# Patient Record
Sex: FEMALE | Race: BLACK OR AFRICAN AMERICAN | NOT HISPANIC OR LATINO | URBAN - METROPOLITAN AREA
[De-identification: names, ages, dates, MRNs, and addresses within clinical notes are randomized per-mention and may not be internally consistent; named-entity substitution may affect disease eponyms.]

---

## 2023-02-20 PROBLEM — M79.2 NEUROPATHIC PAIN: Status: ACTIVE | Noted: 2023-02-20

## 2023-02-20 PROBLEM — Z90.710 S/P HYSTERECTOMY: Status: ACTIVE | Noted: 2023-02-20

## 2023-02-20 PROBLEM — M77.30 HEEL SPUR: Status: ACTIVE | Noted: 2023-02-20

## 2023-02-20 PROBLEM — N89.8 VAGINAL DISCHARGE: Status: ACTIVE | Noted: 2023-02-20

## 2023-02-20 PROBLEM — R30.0 DYSURIA: Status: ACTIVE | Noted: 2023-02-20

## 2023-02-20 PROBLEM — M79.672 PAIN, FOOT, LEFT, CHRONIC: Status: ACTIVE | Noted: 2023-02-20

## 2023-02-20 PROBLEM — R10.9 ABDOMINAL PAIN: Status: ACTIVE | Noted: 2023-02-20

## 2023-02-20 PROBLEM — R12 HEART BURN: Status: ACTIVE | Noted: 2023-02-20

## 2023-02-20 PROBLEM — G47.33 OBSTRUCTIVE SLEEP APNEA SYNDROME, SEVERE: Status: ACTIVE | Noted: 2023-02-20

## 2023-02-20 PROBLEM — Z98.84 BARIATRIC SURGERY STATUS: Status: ACTIVE | Noted: 2023-02-20

## 2023-02-20 PROBLEM — B37.2 CANDIDIASIS, INTERTRIGO: Status: ACTIVE | Noted: 2023-02-20

## 2023-02-20 PROBLEM — D50.9 IRON DEFICIENCY ANEMIA: Status: ACTIVE | Noted: 2023-02-20

## 2023-02-20 PROBLEM — M79.673 HEEL PAIN: Status: ACTIVE | Noted: 2023-02-20

## 2023-02-20 PROBLEM — R32 URINARY LEAKAGE: Status: ACTIVE | Noted: 2023-02-20

## 2023-02-20 PROBLEM — R53.83 FATIGUE: Status: ACTIVE | Noted: 2023-02-20

## 2023-02-20 PROBLEM — D64.9 ANEMIA: Status: ACTIVE | Noted: 2023-02-20

## 2023-02-20 PROBLEM — E11.9 DIABETES MELLITUS (MULTI): Status: ACTIVE | Noted: 2023-02-20

## 2023-02-20 PROBLEM — E78.5 HYPERLIPIDEMIA: Status: ACTIVE | Noted: 2023-02-20

## 2023-02-20 PROBLEM — N92.6 IRREGULAR MENSTRUAL CYCLE: Status: ACTIVE | Noted: 2023-02-20

## 2023-02-20 PROBLEM — G47.30 SEVERE SLEEP APNEA: Status: ACTIVE | Noted: 2023-02-20

## 2023-02-20 PROBLEM — R19.5 DARK STOOLS: Status: ACTIVE | Noted: 2023-02-20

## 2023-02-20 PROBLEM — G89.29 PAIN, FOOT, LEFT, CHRONIC: Status: ACTIVE | Noted: 2023-02-20

## 2023-02-20 PROBLEM — E55.9 VITAMIN D DEFICIENCY: Status: ACTIVE | Noted: 2023-02-20

## 2023-02-20 PROBLEM — R63.4 RAPID WEIGHT LOSS: Status: ACTIVE | Noted: 2023-02-20

## 2023-02-20 PROBLEM — G47.00 INSOMNIA: Status: ACTIVE | Noted: 2023-02-20

## 2023-02-20 PROBLEM — G89.18 POSTOPERATIVE PAIN: Status: ACTIVE | Noted: 2023-02-20

## 2023-02-20 PROBLEM — N94.6 DYSMENORRHEA: Status: ACTIVE | Noted: 2023-02-20

## 2023-02-20 PROBLEM — N93.9 ABNORMAL UTERINE BLEEDING: Status: ACTIVE | Noted: 2023-02-20

## 2023-02-20 PROBLEM — G47.33 OBSTRUCTIVE SLEEP APNEA SYNDROME: Status: ACTIVE | Noted: 2023-02-20

## 2023-02-20 PROBLEM — R07.89 ATYPICAL CHEST PAIN: Status: ACTIVE | Noted: 2023-02-20

## 2023-02-20 PROBLEM — L65.9 HAIR LOSS: Status: ACTIVE | Noted: 2023-02-20

## 2023-02-20 PROBLEM — R73.09 ABNORMAL GLUCOSE LEVEL: Status: ACTIVE | Noted: 2023-02-20

## 2023-02-20 PROBLEM — I10 HTN (HYPERTENSION): Status: ACTIVE | Noted: 2023-02-20

## 2023-02-20 PROBLEM — E66.01 MORBID OBESITY (MULTI): Status: ACTIVE | Noted: 2023-02-20

## 2023-02-20 PROBLEM — K42.9 UMBILICAL HERNIA WITHOUT OBSTRUCTION AND WITHOUT GANGRENE: Status: ACTIVE | Noted: 2023-02-20

## 2023-02-20 PROBLEM — K91.2 MALNUTRITION FOLLOWING GASTROINTESTINAL SURGERY (HHS-HCC): Status: ACTIVE | Noted: 2023-02-20

## 2023-02-20 RX ORDER — CYANOCOBALAMIN 1000 UG/ML
1000 INJECTION, SOLUTION INTRAMUSCULAR; SUBCUTANEOUS
COMMUNITY
Start: 2019-12-09 | End: 2023-03-17 | Stop reason: SDUPTHER

## 2023-02-20 RX ORDER — GABAPENTIN 300 MG/1
1 CAPSULE ORAL 2 TIMES DAILY
COMMUNITY
Start: 2020-04-13 | End: 2023-03-17 | Stop reason: SDUPTHER

## 2023-02-20 RX ORDER — MULTIVIT-MINERALS/FOLIC ACID 200 MCG
2 TABLET,CHEWABLE ORAL 2 TIMES DAILY
COMMUNITY
Start: 2020-03-02 | End: 2023-03-17 | Stop reason: SDUPTHER

## 2023-02-20 RX ORDER — OMEPRAZOLE 40 MG/1
1 CAPSULE, DELAYED RELEASE ORAL DAILY
COMMUNITY
Start: 2017-06-21 | End: 2023-03-17 | Stop reason: SDUPTHER

## 2023-02-20 RX ORDER — ASPIRIN 325 MG
50000 TABLET, DELAYED RELEASE (ENTERIC COATED) ORAL
COMMUNITY
Start: 2019-05-15 | End: 2024-02-20 | Stop reason: SDUPTHER

## 2023-02-20 RX ORDER — ACETAMINOPHEN 500 MG
500 TABLET ORAL AS NEEDED
COMMUNITY
Start: 2022-01-10 | End: 2024-02-16 | Stop reason: SDUPTHER

## 2023-02-20 RX ORDER — FERROUS SULFATE 325(65) MG
1 TABLET ORAL
COMMUNITY
Start: 2019-05-02 | End: 2023-03-17 | Stop reason: SDUPTHER

## 2023-03-17 ENCOUNTER — OFFICE VISIT (OUTPATIENT)
Dept: PRIMARY CARE | Facility: CLINIC | Age: 43
End: 2023-03-17
Payer: COMMERCIAL

## 2023-03-17 VITALS
HEART RATE: 82 BPM | WEIGHT: 242.3 LBS | DIASTOLIC BLOOD PRESSURE: 83 MMHG | BODY MASS INDEX: 38.94 KG/M2 | OXYGEN SATURATION: 96 % | TEMPERATURE: 97.3 F | HEIGHT: 66 IN | SYSTOLIC BLOOD PRESSURE: 133 MMHG

## 2023-03-17 DIAGNOSIS — G89.29 PAIN, FOOT, LEFT, CHRONIC: ICD-10-CM

## 2023-03-17 DIAGNOSIS — E11.9 TYPE 2 DIABETES MELLITUS WITHOUT COMPLICATION, WITHOUT LONG-TERM CURRENT USE OF INSULIN (MULTI): ICD-10-CM

## 2023-03-17 DIAGNOSIS — N62 MACROMASTIA: ICD-10-CM

## 2023-03-17 DIAGNOSIS — Z98.84 BARIATRIC SURGERY STATUS: ICD-10-CM

## 2023-03-17 DIAGNOSIS — M79.672 PAIN, FOOT, LEFT, CHRONIC: ICD-10-CM

## 2023-03-17 DIAGNOSIS — R12 HEART BURN: Primary | ICD-10-CM

## 2023-03-17 PROCEDURE — 3075F SYST BP GE 130 - 139MM HG: CPT | Performed by: STUDENT IN AN ORGANIZED HEALTH CARE EDUCATION/TRAINING PROGRAM

## 2023-03-17 PROCEDURE — 1036F TOBACCO NON-USER: CPT | Performed by: STUDENT IN AN ORGANIZED HEALTH CARE EDUCATION/TRAINING PROGRAM

## 2023-03-17 PROCEDURE — 99214 OFFICE O/P EST MOD 30 MIN: CPT | Performed by: FAMILY MEDICINE

## 2023-03-17 PROCEDURE — 3079F DIAST BP 80-89 MM HG: CPT | Performed by: STUDENT IN AN ORGANIZED HEALTH CARE EDUCATION/TRAINING PROGRAM

## 2023-03-17 RX ORDER — OMEPRAZOLE 40 MG/1
40 CAPSULE, DELAYED RELEASE ORAL DAILY
Qty: 60 CAPSULE | Refills: 1 | Status: SHIPPED | OUTPATIENT
Start: 2023-03-17 | End: 2023-09-04 | Stop reason: SDUPTHER

## 2023-03-17 RX ORDER — CYANOCOBALAMIN 1000 UG/ML
1000 INJECTION, SOLUTION INTRAMUSCULAR; SUBCUTANEOUS
Qty: 1 ML | Refills: 3 | Status: SHIPPED | OUTPATIENT
Start: 2023-03-17 | End: 2023-09-14 | Stop reason: SDUPTHER

## 2023-03-17 RX ORDER — GABAPENTIN 300 MG/1
300 CAPSULE ORAL 2 TIMES DAILY
Qty: 60 CAPSULE | Refills: 2 | Status: SHIPPED | OUTPATIENT
Start: 2023-03-17 | End: 2024-02-16 | Stop reason: SDUPTHER

## 2023-03-17 RX ORDER — MULTIVIT-MINERALS/FOLIC ACID 200 MCG
1 TABLET,CHEWABLE ORAL 2 TIMES DAILY
Qty: 60 EACH | Refills: 3 | Status: SHIPPED | OUTPATIENT
Start: 2023-03-17 | End: 2023-12-14 | Stop reason: SDUPTHER

## 2023-03-17 RX ORDER — FERROUS SULFATE 325(65) MG
1 TABLET ORAL
Qty: 90 TABLET | Refills: 3 | Status: SHIPPED | OUTPATIENT
Start: 2023-03-17 | End: 2023-09-14 | Stop reason: SDUPTHER

## 2023-03-17 ASSESSMENT — PAIN SCALES - GENERAL: PAINLEVEL: 8

## 2023-03-20 NOTE — PROGRESS NOTES
Subjective   Patient ID: Sheri Santos is a 42 y.o. female who presents for Med Refill and Pain (Shoulder and back).    HPI     42 years old female with history of bariatric surgery, type 2 diabetes chronic pain who presented to the clinic today with a complaint of upper back pain and requested medication refill    Upper back pain due to big breasts  - Patient is reporting that she is suffering from chronic upper back pain that she attributes to large breast and she tried multiple conservative measures for many years and now she is ready to be referred to surgery to reduce the size of her breasts.    Bariatric surgery status    Patient bariatric surgery about 5 years ago now she regained most of the weight that she lost of the surgery and would like to receive refill of her chronic medication that she uses ever since the bariatric surgery  -     omeprazole (PriLOSEC) 40 mg DR capsule; Take 1 capsule (40 mg) by mouth once daily.  -     ferrous sulfate 325 (65 Fe) MG tablet; Take 1 tablet (325 mg) by mouth in the morning and 1 tablet (325 mg) in the evening. Take with meals.  -     multivit with min-folic acid (Daily Gummies) 200 mcg tablet,chewable; Chew 1 tablet  in the morning and 1 tablet before bedtime.  -     cyanocobalamin (Vitamin B-12) 1,000 mcg/mL injection; Inject 1 mL (1,000 mcg) into the shoulder, thigh, or buttocks every 30 (thirty) days.  Chronic leg pain  - Patient requesting refill of  -     gabapentin (Neurontin) 300 mg capsule; Take 1 capsule (300 mg) by mouth in the morning and 1 capsule (300 mg) before bedtime.    Type 2 diabetes  - Last A1c was 6.7 in January 2022  - Patient is not currently on any medication and would prefer to try to control her diabetes with diet and exercise    Review of Systems  Review of system is negative unless stated in HPI  Objective   Blood Pressure 133/83 (BP Location: Right arm, Patient Position: Sitting, BP Cuff Size: Large adult)   Pulse 82   Temperature 36.3  "°C (97.3 °F) (Temporal)   Height 1.676 m (5' 6\")   Weight 110 kg (242 lb 4.8 oz)   Oxygen Saturation 96%   Body Mass Index 39.11 kg/m²     Physical Exam  Constitutional: Morbidly obese, does not appear to be in any acute distress   Cardiovascular: RRR, S1/S2, no murmurs, rubs, or gallops, radial pulses +2, no edema of extremities  Pulmonary: CTAB, no respiratory distress.  Abdomen: +BS, soft, non-tender, nondistended, no guarding or rebound, no masses noted  Breast exam: Macromastia  Skin- No lesions, contusions, or erythema.  Psychiatric: Judgment intact. Appropriate mood and behavior    Assessment/Plan   Diagnoses and all orders for this visit:    Pain, foot, left, chronic  -     gabapentin (Neurontin) 300 mg capsule; Take 1 capsule (300 mg) by mouth in the morning and 1 capsule (300 mg) before bedtime.  Bariatric surgery status  -     omeprazole (PriLOSEC) 40 mg DR capsule; Take 1 capsule (40 mg) by mouth once daily.  -     ferrous sulfate 325 (65 Fe) MG tablet; Take 1 tablet (325 mg) by mouth in the morning and 1 tablet (325 mg) in the evening. Take with meals.  -     multivit with min-folic acid (Daily Gummies) 200 mcg tablet,chewable; Chew 1 tablet  in the morning and 1 tablet before bedtime.  -     cyanocobalamin (Vitamin B-12) 1,000 mcg/mL injection; Inject 1 mL (1,000 mcg) into the shoulder, thigh, or buttocks every 30 (thirty) days.  Type 2 diabetes mellitus without complication, without long-term current use of insulin (CMS/[facility])  -     Comprehensive Metabolic Panel; Future  -     Hemoglobin A1C; Future  -     Lipid Panel; Future  -     Albumin , Urine Random; Future  Macromastia  -     Referral to Plastic Surgery; Future    Discussed with attending Dr. Dinh Armstrong [facility] medicine department PGY 3     "

## 2023-03-23 NOTE — PROGRESS NOTES
I saw and evaluated the patient. I personally obtained the key and critical portions of the history and physical exam or was physically present for key and critical portions performed by the resident/fellow. I reviewed the resident/fellow's documentation and discussed the patient with the resident/fellow. I agree with the resident/fellow's medical decision making as documented in the note.    Hortensia Tao MD

## 2023-03-31 ENCOUNTER — APPOINTMENT (OUTPATIENT)
Dept: PRIMARY CARE | Facility: CLINIC | Age: 43
End: 2023-03-31
Payer: COMMERCIAL

## 2023-04-11 ENCOUNTER — APPOINTMENT (OUTPATIENT)
Dept: PRIMARY CARE | Facility: CLINIC | Age: 43
End: 2023-04-11
Payer: COMMERCIAL

## 2023-05-10 LAB
ALANINE AMINOTRANSFERASE (SGPT) (U/L) IN SER/PLAS: 28 U/L (ref 7–45)
ALBUMIN (G/DL) IN SER/PLAS: 4 G/DL (ref 3.4–5)
ALBUMIN (MG/L) IN URINE: 8.3 MG/L
ALBUMIN/CREATININE (UG/MG) IN URINE: 3.6 UG/MG CRT (ref 0–30)
ALKALINE PHOSPHATASE (U/L) IN SER/PLAS: 90 U/L (ref 33–110)
ANION GAP IN SER/PLAS: 10 MMOL/L (ref 10–20)
ASPARTATE AMINOTRANSFERASE (SGOT) (U/L) IN SER/PLAS: 20 U/L (ref 9–39)
BILIRUBIN TOTAL (MG/DL) IN SER/PLAS: 0.4 MG/DL (ref 0–1.2)
CALCIUM (MG/DL) IN SER/PLAS: 9.2 MG/DL (ref 8.6–10.6)
CARBON DIOXIDE, TOTAL (MMOL/L) IN SER/PLAS: 29 MMOL/L (ref 21–32)
CHLORIDE (MMOL/L) IN SER/PLAS: 107 MMOL/L (ref 98–107)
CHOLESTEROL (MG/DL) IN SER/PLAS: 193 MG/DL (ref 0–199)
CHOLESTEROL IN HDL (MG/DL) IN SER/PLAS: 56.5 MG/DL
CHOLESTEROL/HDL RATIO: 3.4
CREATININE (MG/DL) IN SER/PLAS: 0.97 MG/DL (ref 0.5–1.05)
CREATININE (MG/DL) IN URINE: 230 MG/DL (ref 20–320)
ESTIMATED AVERAGE GLUCOSE FOR HBA1C: 140 MG/DL
GFR FEMALE: 74 ML/MIN/1.73M2
GLUCOSE (MG/DL) IN SER/PLAS: 124 MG/DL (ref 74–99)
HEMOGLOBIN A1C/HEMOGLOBIN TOTAL IN BLOOD: 6.5 %
LDL: 107 MG/DL (ref 0–99)
POTASSIUM (MMOL/L) IN SER/PLAS: 4.2 MMOL/L (ref 3.5–5.3)
PROTEIN TOTAL: 7 G/DL (ref 6.4–8.2)
SODIUM (MMOL/L) IN SER/PLAS: 142 MMOL/L (ref 136–145)
TRIGLYCERIDE (MG/DL) IN SER/PLAS: 150 MG/DL (ref 0–149)
UREA NITROGEN (MG/DL) IN SER/PLAS: 15 MG/DL (ref 6–23)
VLDL: 30 MG/DL (ref 0–40)

## 2023-09-04 DIAGNOSIS — Z98.84 BARIATRIC SURGERY STATUS: ICD-10-CM

## 2023-09-04 RX ORDER — OMEPRAZOLE 40 MG/1
40 CAPSULE, DELAYED RELEASE ORAL DAILY
Qty: 60 CAPSULE | Refills: 3 | Status: SHIPPED | OUTPATIENT
Start: 2023-09-04 | End: 2024-02-16 | Stop reason: SDUPTHER

## 2023-09-05 NOTE — PROGRESS NOTES
Medication refill only for Omeprazole 40 mg daily. Formerly seen by Dr. Nathaniel Rosales and will need to establish care with new physician.

## 2023-09-14 DIAGNOSIS — Z98.84 BARIATRIC SURGERY STATUS: ICD-10-CM

## 2023-09-14 RX ORDER — CYANOCOBALAMIN 1000 UG/ML
1000 INJECTION, SOLUTION INTRAMUSCULAR; SUBCUTANEOUS
Qty: 1 ML | Refills: 3 | Status: SHIPPED | OUTPATIENT
Start: 2023-09-14 | End: 2024-02-20 | Stop reason: SDUPTHER

## 2023-09-14 RX ORDER — FERROUS SULFATE 325(65) MG
1 TABLET ORAL
Qty: 90 TABLET | Refills: 3 | Status: SHIPPED | OUTPATIENT
Start: 2023-09-14 | End: 2024-02-16 | Stop reason: SDUPTHER

## 2023-12-14 DIAGNOSIS — Z98.84 BARIATRIC SURGERY STATUS: ICD-10-CM

## 2023-12-14 RX ORDER — MULTIVIT-MINERALS/FOLIC ACID 200 MCG
1 TABLET,CHEWABLE ORAL 2 TIMES DAILY
Qty: 60 EACH | Refills: 3 | Status: SHIPPED | OUTPATIENT
Start: 2023-12-14 | End: 2024-02-16 | Stop reason: SDUPTHER

## 2024-02-16 ENCOUNTER — OFFICE VISIT (OUTPATIENT)
Dept: OBSTETRICS AND GYNECOLOGY | Facility: CLINIC | Age: 44
End: 2024-02-16
Payer: COMMERCIAL

## 2024-02-16 ENCOUNTER — LAB (OUTPATIENT)
Dept: LAB | Facility: LAB | Age: 44
End: 2024-02-16
Payer: COMMERCIAL

## 2024-02-16 VITALS — BODY MASS INDEX: 40.4 KG/M2 | HEIGHT: 66 IN | WEIGHT: 251.4 LBS

## 2024-02-16 DIAGNOSIS — G89.29 CHRONIC PAIN OF BOTH SHOULDERS: ICD-10-CM

## 2024-02-16 DIAGNOSIS — G89.29 PAIN, FOOT, LEFT, CHRONIC: ICD-10-CM

## 2024-02-16 DIAGNOSIS — M25.511 CHRONIC PAIN OF BOTH SHOULDERS: ICD-10-CM

## 2024-02-16 DIAGNOSIS — M54.2 NECK PAIN: ICD-10-CM

## 2024-02-16 DIAGNOSIS — N62 MACROMASTIA: ICD-10-CM

## 2024-02-16 DIAGNOSIS — E11.9 TYPE 2 DIABETES MELLITUS WITHOUT COMPLICATION, WITHOUT LONG-TERM CURRENT USE OF INSULIN (MULTI): ICD-10-CM

## 2024-02-16 DIAGNOSIS — Z12.11 COLON CANCER SCREENING: ICD-10-CM

## 2024-02-16 DIAGNOSIS — R63.5 WEIGHT GAIN: ICD-10-CM

## 2024-02-16 DIAGNOSIS — Z01.419 WOMEN'S ANNUAL ROUTINE GYNECOLOGICAL EXAMINATION: Primary | ICD-10-CM

## 2024-02-16 DIAGNOSIS — Z00.00 HEALTHCARE MAINTENANCE: ICD-10-CM

## 2024-02-16 DIAGNOSIS — E55.9 VITAMIN D INSUFFICIENCY: ICD-10-CM

## 2024-02-16 DIAGNOSIS — M25.512 CHRONIC PAIN OF BOTH SHOULDERS: ICD-10-CM

## 2024-02-16 DIAGNOSIS — M79.672 PAIN, FOOT, LEFT, CHRONIC: ICD-10-CM

## 2024-02-16 DIAGNOSIS — Z98.84 BARIATRIC SURGERY STATUS: ICD-10-CM

## 2024-02-16 DIAGNOSIS — Z12.31 ENCOUNTER FOR SCREENING MAMMOGRAM FOR MALIGNANT NEOPLASM OF BREAST: ICD-10-CM

## 2024-02-16 DIAGNOSIS — N39.3 STRESS INCONTINENCE OF URINE: ICD-10-CM

## 2024-02-16 LAB — 25(OH)D3 SERPL-MCNC: 35 NG/ML (ref 30–100)

## 2024-02-16 PROCEDURE — 3048F LDL-C <100 MG/DL: CPT | Performed by: OBSTETRICS & GYNECOLOGY

## 2024-02-16 PROCEDURE — 36415 COLL VENOUS BLD VENIPUNCTURE: CPT

## 2024-02-16 PROCEDURE — 1036F TOBACCO NON-USER: CPT | Performed by: OBSTETRICS & GYNECOLOGY

## 2024-02-16 PROCEDURE — 99396 PREV VISIT EST AGE 40-64: CPT | Performed by: OBSTETRICS & GYNECOLOGY

## 2024-02-16 PROCEDURE — 3044F HG A1C LEVEL LT 7.0%: CPT | Performed by: OBSTETRICS & GYNECOLOGY

## 2024-02-16 PROCEDURE — 99214 OFFICE O/P EST MOD 30 MIN: CPT | Performed by: OBSTETRICS & GYNECOLOGY

## 2024-02-16 PROCEDURE — 80061 LIPID PANEL: CPT

## 2024-02-16 PROCEDURE — 80053 COMPREHEN METABOLIC PANEL: CPT

## 2024-02-16 PROCEDURE — 83036 HEMOGLOBIN GLYCOSYLATED A1C: CPT

## 2024-02-16 PROCEDURE — 82306 VITAMIN D 25 HYDROXY: CPT

## 2024-02-16 RX ORDER — CYANOCOBALAMIN 1000 UG/ML
1000 INJECTION, SOLUTION INTRAMUSCULAR; SUBCUTANEOUS
Qty: 1 ML | Refills: 3 | Status: CANCELLED | OUTPATIENT
Start: 2024-02-16

## 2024-02-16 RX ORDER — MULTIVIT-MINERALS/FOLIC ACID 200 MCG
1 TABLET,CHEWABLE ORAL 2 TIMES DAILY
Qty: 60 EACH | Refills: 3 | Status: SHIPPED | OUTPATIENT
Start: 2024-02-16 | End: 2024-03-22 | Stop reason: SDUPTHER

## 2024-02-16 RX ORDER — ACETAMINOPHEN 500 MG
500 TABLET ORAL AS NEEDED
Qty: 30 TABLET | Refills: 3 | Status: SHIPPED | OUTPATIENT
Start: 2024-02-16 | End: 2024-03-22 | Stop reason: SDUPTHER

## 2024-02-16 RX ORDER — ASPIRIN 325 MG
50000 TABLET, DELAYED RELEASE (ENTERIC COATED) ORAL
Status: CANCELLED | OUTPATIENT
Start: 2024-02-16

## 2024-02-16 RX ORDER — FERROUS SULFATE 325(65) MG
1 TABLET ORAL
Qty: 90 TABLET | Refills: 3 | Status: SHIPPED | OUTPATIENT
Start: 2024-02-16 | End: 2024-03-22 | Stop reason: SDUPTHER

## 2024-02-16 RX ORDER — GABAPENTIN 300 MG/1
300 CAPSULE ORAL 2 TIMES DAILY
Qty: 60 CAPSULE | Refills: 2 | Status: SHIPPED | OUTPATIENT
Start: 2024-02-16 | End: 2024-03-22 | Stop reason: SDUPTHER

## 2024-02-16 RX ORDER — OMEPRAZOLE 40 MG/1
40 CAPSULE, DELAYED RELEASE ORAL DAILY
Qty: 60 CAPSULE | Refills: 3 | Status: SHIPPED | OUTPATIENT
Start: 2024-02-16 | End: 2024-03-22 | Stop reason: SDUPTHER

## 2024-02-16 ASSESSMENT — PAIN SCALES - GENERAL: PAINLEVEL: 0-NO PAIN

## 2024-02-16 NOTE — PROGRESS NOTES
Sheri Santos 43 y.o. y/o who presents for annual gyn exam.      Preventive health  - cervical cancer screening no longer indicated  - mammogram due ordered  - c-scope due ordered    Concerned about weight gain  - referral to dietitian  - discussed returning to [facility] to discuss possibility of other interventions to help with weight loss goals    Upper back, shoulder pain, possibly due to macromastia  - referral to plastic surgery to discuss breast reduction  - referral to PT for left shoulder pain, neck pain for strengthening    Urinary incontinence  - pelvic floor PT referral    Referral to primary care  - wants somebody who will not leave Q 3 years  - meds refilled today        -------------------------------------  HPI    Leaking urine when laughs/coughs    Concerned about regain of weight, has h/o weight loss surgery    Referral to plastic surgery      Physical Exam  Constitutional:       Appearance: Normal appearance.   Genitourinary:      Vulva normal.      Right Labia: No rash, lesions, skin changes or Bartholin's cyst.     Left Labia: No lesions, skin changes, Bartholin's cyst or rash.     Vaginal cuff intact.     No vaginal discharge, erythema, tenderness, bleeding, ulceration or granulation tissue.      No cervical discharge, lesion or polyp.      Pelvic Floor: Levator muscle strength is 2/5.  HENT:      Head: Normocephalic and atraumatic.   Pulmonary:      Effort: Pulmonary effort is normal.   Musculoskeletal:      Cervical back: Neck supple.   Neurological:      Mental Status: She is alert.   Skin:     General: Skin is warm and dry.   Psychiatric:         Mood and Affect: Mood normal.         Behavior: Behavior normal.         Thought Content: Thought content normal.         Judgment: Judgment normal.

## 2024-02-17 LAB
ALBUMIN SERPL BCP-MCNC: 4 G/DL (ref 3.4–5)
ALP SERPL-CCNC: 82 U/L (ref 33–110)
ALT SERPL W P-5'-P-CCNC: 17 U/L (ref 7–45)
ANION GAP SERPL CALC-SCNC: 14 MMOL/L (ref 10–20)
AST SERPL W P-5'-P-CCNC: 17 U/L (ref 9–39)
BILIRUB SERPL-MCNC: 0.2 MG/DL (ref 0–1.2)
BUN SERPL-MCNC: 11 MG/DL (ref 6–23)
CALCIUM SERPL-MCNC: 9 MG/DL (ref 8.6–10.6)
CHLORIDE SERPL-SCNC: 106 MMOL/L (ref 98–107)
CHOLEST SERPL-MCNC: 160 MG/DL (ref 0–199)
CHOLESTEROL/HDL RATIO: 3.3
CO2 SERPL-SCNC: 27 MMOL/L (ref 21–32)
CREAT SERPL-MCNC: 1 MG/DL (ref 0.5–1.05)
EGFRCR SERPLBLD CKD-EPI 2021: 71 ML/MIN/1.73M*2
EST. AVERAGE GLUCOSE BLD GHB EST-MCNC: 140 MG/DL
GLUCOSE SERPL-MCNC: 87 MG/DL (ref 74–99)
HBA1C MFR BLD: 6.5 %
HDLC SERPL-MCNC: 48.9 MG/DL
LDLC SERPL CALC-MCNC: 87 MG/DL
NON HDL CHOLESTEROL: 111 MG/DL (ref 0–149)
POTASSIUM SERPL-SCNC: 4.4 MMOL/L (ref 3.5–5.3)
PROT SERPL-MCNC: 7 G/DL (ref 6.4–8.2)
SODIUM SERPL-SCNC: 143 MMOL/L (ref 136–145)
TRIGL SERPL-MCNC: 120 MG/DL (ref 0–149)
VLDL: 24 MG/DL (ref 0–40)

## 2024-02-18 PROBLEM — Z01.419 WOMEN'S ANNUAL ROUTINE GYNECOLOGICAL EXAMINATION: Status: ACTIVE | Noted: 2024-02-18

## 2024-02-20 DIAGNOSIS — E55.9 VITAMIN D DEFICIENCY: Primary | ICD-10-CM

## 2024-02-20 DIAGNOSIS — Z98.84 BARIATRIC SURGERY STATUS: ICD-10-CM

## 2024-02-20 RX ORDER — ASPIRIN 325 MG
50000 TABLET, DELAYED RELEASE (ENTERIC COATED) ORAL
Qty: 4 CAPSULE | Refills: 3 | Status: SHIPPED | OUTPATIENT
Start: 2024-02-20 | End: 2024-03-22 | Stop reason: SDUPTHER

## 2024-02-20 RX ORDER — CYANOCOBALAMIN 1000 UG/ML
1000 INJECTION, SOLUTION INTRAMUSCULAR; SUBCUTANEOUS
Qty: 1 ML | Refills: 3 | Status: SHIPPED | OUTPATIENT
Start: 2024-02-20 | End: 2024-03-22 | Stop reason: SDUPTHER

## 2024-02-20 NOTE — TELEPHONE ENCOUNTER
Pt called requesting refill of vitamins B12 and D3. Message sent to provider.    - Not seen since March 17th 2023 via [facility].     - Last B12 was 543 Jan 2022, althoufgh patient has hx of bariatric surgery and need lifelong Vitamin B12 supplementation    - Last Vit D was 35 on 2/16/2024 -  Refilled    - Has appointment March 28th

## 2024-03-22 ENCOUNTER — OFFICE VISIT (OUTPATIENT)
Dept: PRIMARY CARE | Facility: CLINIC | Age: 44
End: 2024-03-22
Payer: COMMERCIAL

## 2024-03-22 VITALS
OXYGEN SATURATION: 96 % | BODY MASS INDEX: 40.02 KG/M2 | HEIGHT: 66 IN | DIASTOLIC BLOOD PRESSURE: 92 MMHG | TEMPERATURE: 97.1 F | HEART RATE: 79 BPM | WEIGHT: 249 LBS | SYSTOLIC BLOOD PRESSURE: 139 MMHG

## 2024-03-22 DIAGNOSIS — M25.511 CHRONIC PAIN OF BOTH SHOULDERS: ICD-10-CM

## 2024-03-22 DIAGNOSIS — M25.512 CHRONIC PAIN OF BOTH SHOULDERS: ICD-10-CM

## 2024-03-22 DIAGNOSIS — G89.29 CHRONIC PAIN OF BOTH SHOULDERS: ICD-10-CM

## 2024-03-22 DIAGNOSIS — Z98.84 BARIATRIC SURGERY STATUS: ICD-10-CM

## 2024-03-22 DIAGNOSIS — G47.00 INSOMNIA, UNSPECIFIED TYPE: Primary | ICD-10-CM

## 2024-03-22 DIAGNOSIS — E55.9 VITAMIN D DEFICIENCY: ICD-10-CM

## 2024-03-22 DIAGNOSIS — M54.2 NECK PAIN: ICD-10-CM

## 2024-03-22 DIAGNOSIS — E11.42 TYPE 2 DIABETES MELLITUS WITH DIABETIC POLYNEUROPATHY, WITHOUT LONG-TERM CURRENT USE OF INSULIN (MULTI): ICD-10-CM

## 2024-03-22 DIAGNOSIS — E11.9 TYPE 2 DIABETES MELLITUS WITHOUT COMPLICATION, WITHOUT LONG-TERM CURRENT USE OF INSULIN (MULTI): ICD-10-CM

## 2024-03-22 PROCEDURE — 3044F HG A1C LEVEL LT 7.0%: CPT

## 2024-03-22 PROCEDURE — 99214 OFFICE O/P EST MOD 30 MIN: CPT

## 2024-03-22 PROCEDURE — 3048F LDL-C <100 MG/DL: CPT

## 2024-03-22 PROCEDURE — 3075F SYST BP GE 130 - 139MM HG: CPT

## 2024-03-22 PROCEDURE — 3080F DIAST BP >= 90 MM HG: CPT

## 2024-03-22 PROCEDURE — 1036F TOBACCO NON-USER: CPT

## 2024-03-22 RX ORDER — PEDIATRIC MULTIVITAMIN NO.238
1 TABLET,CHEWABLE ORAL 2 TIMES DAILY
Qty: 60 EACH | Refills: 3 | Status: SHIPPED | OUTPATIENT
Start: 2024-03-22 | End: 2025-03-22

## 2024-03-22 RX ORDER — CYANOCOBALAMIN 1000 UG/ML
1000 INJECTION, SOLUTION INTRAMUSCULAR; SUBCUTANEOUS
Qty: 1 ML | Refills: 3 | Status: SHIPPED | OUTPATIENT
Start: 2024-03-22

## 2024-03-22 RX ORDER — ASPIRIN 325 MG
50000 TABLET, DELAYED RELEASE (ENTERIC COATED) ORAL
Qty: 4 CAPSULE | Refills: 3 | Status: SHIPPED | OUTPATIENT
Start: 2024-03-22 | End: 2024-06-03

## 2024-03-22 RX ORDER — ORPHENADRINE CITRATE 100 MG/1
TABLET, EXTENDED RELEASE ORAL
COMMUNITY
Start: 2019-07-30 | End: 2024-03-22 | Stop reason: ALTCHOICE

## 2024-03-22 RX ORDER — FERROUS SULFATE 325(65) MG
1 TABLET ORAL
Qty: 90 TABLET | Refills: 3 | Status: SHIPPED | OUTPATIENT
Start: 2024-03-22

## 2024-03-22 RX ORDER — GABAPENTIN 300 MG/1
300 CAPSULE ORAL 3 TIMES DAILY
Qty: 60 CAPSULE | Refills: 2 | Status: SHIPPED | OUTPATIENT
Start: 2024-03-22

## 2024-03-22 RX ORDER — MELATONIN 3 MG
3 CAPSULE ORAL NIGHTLY
Qty: 30 CAPSULE | Refills: 2 | Status: SHIPPED | OUTPATIENT
Start: 2024-03-22 | End: 2024-05-20

## 2024-03-22 RX ORDER — OMEPRAZOLE 40 MG/1
40 CAPSULE, DELAYED RELEASE ORAL DAILY
Qty: 30 CAPSULE | Refills: 11 | Status: SHIPPED | OUTPATIENT
Start: 2024-03-22 | End: 2025-03-22

## 2024-03-22 RX ORDER — CHLORHEXIDINE GLUCONATE ORAL RINSE 1.2 MG/ML
SOLUTION DENTAL
COMMUNITY
Start: 2024-03-16 | End: 2024-03-22 | Stop reason: SDUPTHER

## 2024-03-22 RX ORDER — CHLORHEXIDINE GLUCONATE ORAL RINSE 1.2 MG/ML
SOLUTION DENTAL
Qty: 120 ML | Refills: 11 | Status: SHIPPED | OUTPATIENT
Start: 2024-03-22 | End: 2024-03-22 | Stop reason: ALTCHOICE

## 2024-03-22 RX ORDER — ACETAMINOPHEN 500 MG
1000 TABLET ORAL EVERY 6 HOURS PRN
Qty: 30 TABLET | Refills: 3 | Status: SHIPPED | OUTPATIENT
Start: 2024-03-22 | End: 2024-05-20

## 2024-03-22 ASSESSMENT — PAIN SCALES - GENERAL: PAINLEVEL: 10-WORST PAIN EVER

## 2024-03-22 NOTE — PROGRESS NOTES
Subjective   Patient ID: Sheri Santos is a 43 y.o. female with PMH of Obesity s/p bariatric surgery, type 2 diabetes c/p neuropathy? chronic pain (back/shoulder) who presents for Establish Care (Shoulder pain, possible UTI and other issues. ), headaches, back pain neuropathy, med refill.    HPI    1) Headaches  - tension-like  - runs a   - gets sleep -> 4h  - No aura sx  - Not relieved by tylenol  - Does not use tylenol frequently because it doesn't help    2) Back Pain  - chronic  - described as soreness  - pt attributes to large breast and is interested in breast reduction surgery  - b/l upper back  - Never tried PT    3) DM-2 c/p neuropathy?  - meds: Not on meds, previously tried metformin but stopped 2/2 pt preference & GI upset  - measuring BG at home?: No  - Last A1c: 6.5 2/16/24  - smoking: No  - denies polydipsia, polyuria, vision changes  - Reports neuropathy      Review of Systems   All other systems reviewed and are negative.      Current Outpatient Medications on File Prior to Visit   Medication Sig Dispense Refill    [DISCONTINUED] chlorhexidine (Peridex) 0.12 % solution PLEASE SEE ATTACHED FOR DETAILED DIRECTIONS      [DISCONTINUED] orphenadrine (Norflex) 100 mg 12 hr tablet Take by mouth once daily.      [DISCONTINUED] acetaminophen (Tylenol) 500 mg tablet Take 1 tablet (500 mg) by mouth if needed for moderate pain (4 - 6) or mild pain (1 - 3). Every 4 to 6 hours 30 tablet 3    [DISCONTINUED] cholecalciferol (Vitamin D-3) 50,000 unit capsule Take 1 capsule (50,000 Units) by mouth 1 (one) time per week. 4 capsule 3    [DISCONTINUED] cyanocobalamin (Vitamin B-12) 1,000 mcg/mL injection Inject 1 mL (1,000 mcg) into the muscle every 30 (thirty) days. 1 mL 3    [DISCONTINUED] ferrous sulfate, 325 mg ferrous sulfate, tablet Take 1 tablet by mouth 2 times a day with meals. 90 tablet 3    [DISCONTINUED] gabapentin (Neurontin) 300 mg capsule Take 1 capsule (300 mg) by mouth 2 times a day. 60 capsule  "2    [DISCONTINUED] multivit with min-folic acid (Daily Gummies) 200 mcg tablet,chewable Chew 1 tablet 2 times a day. 60 each 3    [DISCONTINUED] omeprazole (PriLOSEC) 40 mg DR capsule Take 1 capsule (40 mg) by mouth once daily. 60 capsule 3     No current facility-administered medications on file prior to visit.        Objective   Vitals: BP (!) 139/92 (BP Location: Left arm, Patient Position: Sitting)   Pulse 79   Temp 36.2 °C (97.1 °F) (Temporal)   Ht 1.676 m (5' 6\")   Wt 113 kg (249 lb)   LMP  (LMP Unknown)   SpO2 96%   BMI 40.19 kg/m²      Physical Exam  General: well-appearing, NAD  HEENT: NC/AT  Cardiac: rrr, no m/r/g  Lungs: normal work of breathing, CTAB  Abdomen: soft, non-tender, non-distended, BS present  Neuro: grossly intact  MSK: Tender to palpation of paraspinal Upper back  No peripheral edema, cyanosis, or pallor  Psych: no depression, anxiety      Assessment/Plan     Sheri Santos is a 43 y.o. female with PMH of Obesity s/p bariatric surgery, type 2 diabetes c/p neuropathy? chronic pain (back/shoulder) who presents with headaches, chronic back pain & neuropathy. Headaches likely tension multifactorial but worsened by poor sleep hygiene. Chronic back pain likely musculoskeletal from poor posture/ large body habitus/ breast. Neuropathy possibly iso chronic untreated DM-2 although A1c mildly elevated.    Insomnia, unspecified type  -     melatonin 3 mg capsule; Take 3 mg by mouth once daily at bedtime.  - Educated patient on sleep hygiene, pamphlet given  Bariatric surgery status  -     omeprazole (PriLOSEC) 40 mg DR capsule; Take 1 capsule (40 mg) by mouth once daily.  -     multivit with min-folic acid (Daily Gummies) 200 mcg tablet,chewable; Chew 1 tablet 2 times a day.  -     ferrous sulfate, 325 mg ferrous sulfate, tablet; Take 1 tablet by mouth 2 times a day with meals.  -     cyanocobalamin (Vitamin B-12) 1,000 mcg/mL injection; Inject 1 mL (1,000 mcg) into the muscle every 30 (thirty) " days.  Vitamin D deficiency  -     cholecalciferol (Vitamin D-3) 50,000 unit capsule; Take 1 capsule (50,000 Units) by mouth 1 (one) time per week.  Chronic pain of both shoulders  Neck pain  :: Advised PT (already referred by Dr. Bennett) & plans to see plastic surgery for breast reduction.  -     acetaminophen (Tylenol) 500 mg tablet; Take 2 tablets (1,000 mg) by mouth every 6 hours if needed for moderate pain (4 - 6) or mild pain (1 - 3). Every 4 to 6 hours  Type 2 diabetes mellitus with diabetic polyneuropathy, without long-term current use of insulin (CMS/[facility])  :: Increased Gabapentin dosage & consider duloxetine if no significant improvement  -     gabapentin (Neurontin) 300 mg capsule; Take 1 capsule (300 mg) by mouth 3 times a day.    Attending Supervision: seen and discussed with attending physician (cosigner listed on this note).    RTC in 3-4 months, or earlier as needed.    Gisel Fair M.D.  Family Medicine  PGY-1

## 2024-03-24 NOTE — PROGRESS NOTES
I saw and evaluated the patient. I personally obtained the key and critical portions of the history and physical exam or was physically present for key and critical portions performed by the resident. I reviewed the resident's documentation and discussed the patient with the resident. I agree with the resident's medical decision making as documented in the note.    Piyush Sinha MD

## 2024-03-25 RX ORDER — CHLORHEXIDINE GLUCONATE ORAL RINSE 1.2 MG/ML
SOLUTION DENTAL
Qty: 473 ML | Refills: 11 | OUTPATIENT
Start: 2024-03-25

## 2024-03-28 ENCOUNTER — APPOINTMENT (OUTPATIENT)
Dept: PRIMARY CARE | Facility: CLINIC | Age: 44
End: 2024-03-28
Payer: COMMERCIAL

## 2024-05-01 ENCOUNTER — APPOINTMENT (OUTPATIENT)
Dept: PRIMARY CARE | Facility: CLINIC | Age: 44
End: 2024-05-01
Payer: COMMERCIAL

## 2024-05-19 DIAGNOSIS — M25.511 CHRONIC PAIN OF BOTH SHOULDERS: ICD-10-CM

## 2024-05-19 DIAGNOSIS — M54.2 NECK PAIN: ICD-10-CM

## 2024-05-19 DIAGNOSIS — G47.00 INSOMNIA, UNSPECIFIED TYPE: ICD-10-CM

## 2024-05-19 DIAGNOSIS — G89.29 CHRONIC PAIN OF BOTH SHOULDERS: ICD-10-CM

## 2024-05-19 DIAGNOSIS — M25.512 CHRONIC PAIN OF BOTH SHOULDERS: ICD-10-CM

## 2024-05-20 RX ORDER — TALC
POWDER (GRAM) TOPICAL
Qty: 30 TABLET | Refills: 11 | Status: SHIPPED | OUTPATIENT
Start: 2024-05-20

## 2024-05-20 RX ORDER — ACETAMINOPHEN 500 MG
1000 TABLET ORAL EVERY 6 HOURS PRN
Qty: 60 TABLET | Refills: 3 | Status: SHIPPED | OUTPATIENT
Start: 2024-05-20

## 2024-06-02 DIAGNOSIS — E55.9 VITAMIN D DEFICIENCY: ICD-10-CM

## 2024-06-03 RX ORDER — ASPIRIN 325 MG
50000 TABLET, DELAYED RELEASE (ENTERIC COATED) ORAL
Qty: 4 CAPSULE | Refills: 3 | Status: SHIPPED | OUTPATIENT
Start: 2024-06-09

## 2024-07-26 ENCOUNTER — HOSPITAL ENCOUNTER (OUTPATIENT)
Dept: GASTROENTEROLOGY | Facility: HOSPITAL | Age: 44
Setting detail: OUTPATIENT SURGERY
Discharge: HOME | End: 2024-07-26
Payer: COMMERCIAL

## 2024-07-26 VITALS
TEMPERATURE: 97.2 F | SYSTOLIC BLOOD PRESSURE: 114 MMHG | WEIGHT: 242 LBS | OXYGEN SATURATION: 100 % | RESPIRATION RATE: 18 BRPM | HEIGHT: 66 IN | HEART RATE: 60 BPM | BODY MASS INDEX: 38.89 KG/M2 | DIASTOLIC BLOOD PRESSURE: 69 MMHG

## 2024-07-26 DIAGNOSIS — Z12.11 ENCOUNTER FOR SCREENING FOR MALIGNANT NEOPLASM OF COLON: Primary | ICD-10-CM

## 2024-07-26 PROCEDURE — 3700000012 HC SEDATION LEVEL 5+ TIME - INITIAL 15 MINUTES 5/> YEARS

## 2024-07-26 PROCEDURE — 45378 DIAGNOSTIC COLONOSCOPY: CPT | Performed by: INTERNAL MEDICINE

## 2024-07-26 PROCEDURE — 7100000009 HC PHASE TWO TIME - INITIAL BASE CHARGE

## 2024-07-26 PROCEDURE — 3700000013 HC SEDATION LEVEL 5+ TIME - EACH ADDITIONAL 15 MINUTES

## 2024-07-26 PROCEDURE — 7100000010 HC PHASE TWO TIME - EACH INCREMENTAL 1 MINUTE

## 2024-07-26 PROCEDURE — 2500000004 HC RX 250 GENERAL PHARMACY W/ HCPCS (ALT 636 FOR OP/ED): Mod: SE | Performed by: INTERNAL MEDICINE

## 2024-07-26 RX ORDER — MEPERIDINE HYDROCHLORIDE 25 MG/ML
INJECTION INTRAMUSCULAR; INTRAVENOUS; SUBCUTANEOUS AS NEEDED
Status: COMPLETED | OUTPATIENT
Start: 2024-07-26 | End: 2024-07-26

## 2024-07-26 RX ORDER — MIDAZOLAM HYDROCHLORIDE 5 MG/ML
INJECTION, SOLUTION INTRAMUSCULAR; INTRAVENOUS AS NEEDED
Status: COMPLETED | OUTPATIENT
Start: 2024-07-26 | End: 2024-07-26

## 2024-07-26 RX ORDER — MIDAZOLAM HYDROCHLORIDE 1 MG/ML
INJECTION, SOLUTION INTRAMUSCULAR; INTRAVENOUS AS NEEDED
Status: COMPLETED | OUTPATIENT
Start: 2024-07-26 | End: 2024-07-26

## 2024-07-26 RX ORDER — SODIUM CHLORIDE, SODIUM LACTATE, POTASSIUM CHLORIDE, CALCIUM CHLORIDE 600; 310; 30; 20 MG/100ML; MG/100ML; MG/100ML; MG/100ML
20 INJECTION, SOLUTION INTRAVENOUS CONTINUOUS
Status: DISCONTINUED | OUTPATIENT
Start: 2024-07-26 | End: 2024-07-27 | Stop reason: HOSPADM

## 2024-07-26 ASSESSMENT — PAIN SCALES - GENERAL
PAINLEVEL_OUTOF10: 0 - NO PAIN
PAINLEVEL_OUTOF10: 2
PAINLEVEL_OUTOF10: 2
PAINLEVEL_OUTOF10: 0 - NO PAIN

## 2024-07-26 ASSESSMENT — COLUMBIA-SUICIDE SEVERITY RATING SCALE - C-SSRS
1. IN THE PAST MONTH, HAVE YOU WISHED YOU WERE DEAD OR WISHED YOU COULD GO TO SLEEP AND NOT WAKE UP?: NO
2. HAVE YOU ACTUALLY HAD ANY THOUGHTS OF KILLING YOURSELF?: NO
6. HAVE YOU EVER DONE ANYTHING, STARTED TO DO ANYTHING, OR PREPARED TO DO ANYTHING TO END YOUR LIFE?: NO

## 2024-07-26 NOTE — H&P
History Of Present Illness  Sheri Santos is a 43 y.o. female with PMH HTN, HLD, DM II, umbilical hernia, DENYS, ELMER, RxY? gastric bypass, hysterectomy presenting for index colonoscopy for colorectal cancer screening.    She denies chronic constipation, diarrhea, abdominal pain, rectal pain, melena, hematochezia, unintended weight loss.    This is the patient's first colonoscopy. She otherwise denies family history of GI cancers and advanced adenomas.    No tobacco or significant alcohol use.     Past Medical History  Past Medical History:   Diagnosis Date    Dietary counseling and surveillance 12/20/2016    Pre-bariatric surgery nutrition evaluation    Encounter for preprocedural cardiovascular examination 03/07/2017    Pre-operative cardiovascular examination    Other acute postprocedural pain     Post-op pain    Personal history of other specified conditions 09/20/2017    History of abdominal pain    Personal history of other specified conditions 09/20/2017    History of postoperative nausea and vomiting     Surgical History  Past Surgical History:   Procedure Laterality Date    APPENDECTOMY  12/21/2016    Appendectomy    GASTRIC BYPASS  07/12/2017    Gastric Surgery For Morbid Obesity Bypass With [id number]    LAPAROSCOPIC HYSTERECTOMY  07/2021    total hyst, bilateral salpingectomy    UMBILICAL HERNIA REPAIR  12/21/2016    Umbilical Hernia Repair     Social History  She reports that she has never smoked. She has never used smokeless tobacco. She reports current alcohol use. She reports that she does not use drugs.    Family History  Family History   Problem Relation Name Age of Onset    Hypertension Mother      Cancer Mother      Diabetes Mother      Hypertension Father      Diabetes Father          Allergies  Allergies   Allergen Reactions    Metoclopramide Rash     Review of Systems   All other systems reviewed and are negative.    Pre-sedation Evaluation:  ASA Classification - ASA 2 - Patient with mild  "systemic disease with no functional limitations  Mallampati Score - IV (only hard palate visible)    Physical Exam  Constitutional:       Appearance: Normal appearance.   HENT:      Head: Normocephalic and atraumatic.      Nose: Nose normal.      Mouth/Throat:      Mouth: Mucous membranes are moist.      Pharynx: Oropharynx is clear.   Eyes:      General: No scleral icterus.     Extraocular Movements: Extraocular movements intact.      Conjunctiva/sclera: Conjunctivae normal.   Cardiovascular:      Rate and Rhythm: Normal rate and regular rhythm.      Pulses: Normal pulses.      Heart sounds: Normal heart sounds.   Pulmonary:      Effort: Pulmonary effort is normal.      Breath sounds: Normal breath sounds.   Abdominal:      General: Abdomen is flat. Bowel sounds are normal. There is no distension.      Palpations: Abdomen is soft. There is no mass.      Tenderness: There is no abdominal tenderness. There is no guarding or rebound.   Skin:     General: Skin is warm and dry.   Neurological:      Mental Status: She is alert and oriented to person, place, and time. Mental status is at baseline.          Last Recorded Vitals  Blood pressure (!) 157/95, pulse 74, temperature 36.2 °C (97.2 °F), resp. rate 16, height 1.676 m (5' 6\"), weight 110 kg (242 lb), SpO2 99%.     Assessment/Plan   Sheri Santos is a 43 y.o. female with PMH HTN, HLD, DM II, umbilical hernia, DENYS, ELMER, RxY? gastric bypass, hysterectomy presenting for index colonoscopy for colorectal cancer screening. She is average risk and asymptomatic.    #Colorectal Cancer Screening, average risk.  -Will proceed with colonoscopy under moderate sedation today    José Miguel Allred MD  Gastroenterology Fellow     PTA/Current Medications:  (Not in a hospital admission)    Current Outpatient Medications   Medication Sig Dispense Refill    cholecalciferol (Vitamin D-3) 50,000 unit capsule TAKE 1 CAPSULE (64848 UNITS) BY MOUTH ONE TIME PER WEEK 4 capsule 3    " cyanocobalamin (Vitamin B-12) 1,000 mcg/mL injection Inject 1 mL (1,000 mcg) into the muscle every 30 (thirty) days. 1 mL 3    ferrous sulfate, 325 mg ferrous sulfate, tablet Take 1 tablet by mouth 2 times a day with meals. 90 tablet 3    gabapentin (Neurontin) 300 mg capsule Take 1 capsule (300 mg) by mouth 3 times a day. 60 capsule 2    acetaminophen (Tylenol) 500 mg tablet TAKE 2 TABLETS (1,000 MG) BY MOUTH EVERY 6 HOURS IF NEEDED FOR MODERATE PAIN (4 - 6) OR MILD PAIN (1 - 3). EVERY 4 TO 6 HOURS 60 tablet 3    melatonin 3 mg tablet TAKE 3 MG BY MOUTH ONCE DAILY AT BEDTIME. 30 tablet 11    multivit with min-folic acid (Daily Gummies) 200 mcg tablet,chewable Chew 1 tablet 2 times a day. 60 each 3    omeprazole (PriLOSEC) 40 mg DR capsule Take 1 capsule (40 mg) by mouth once daily. (Patient not taking: Reported on 7/26/2024) 30 capsule 11     Current Facility-Administered Medications   Medication Dose Route Frequency Provider Last Rate Last Admin    lactated Ringer's infusion  20 mL/hr intravenous Continuous MD Jsoé Miguel Mehta MD

## 2024-07-26 NOTE — DISCHARGE INSTRUCTIONS
Patient Instructions after a Colonoscopy      The anesthetics, sedatives or narcotics which were given to you today will be acting in your body for the next 24 hours, so you might feel a little sleepy or groggy.  This feeling should slowly wear off. Carefully read and follow the instructions.     You received sedation today:  - Do not drive or operate any machinery or power tools of any kind.   - No alcoholic beverages today, not even beer or wine.  - Do not make any important decisions or sign any legal documents.  - No over the counter medications that contain alcohol or that may cause drowsiness.  - Do not make any important decisions or sign any legal documents.  - Make sure you have someone with you for first 24 hours.    While it is common to experience mild to moderate abdominal distention, gas, or belching after your procedure, if any of these symptoms occur following discharge from the GI Lab or within one week of having your procedure, call the [facility] to be advised whether a visit to your nearest Urgent Care or Emergency Department is indicated.  Take this paper with you if you go.     - If you develop an allergic reaction to the medications that were given during your procedure such as difficulty breathing, rash, hives, severe nausea, vomiting or lightheadedness.  - If you experience chest pain, shortness of breath, severe abdominal pain, fevers and chills.  -If you develop signs and symptoms of bleeding such as blood in your spit, if your stools turn black, tarry, or bloody  - If you have not urinated within 8 hours following your procedure.  - If your IV site becomes painful, red, inflamed, or looks infected.    If you received a biopsy/polypectomy/sphincterotomy the following instructions apply below:    __ Do not use Aspirin containing products, non-steroidal medications or anti-coagulants for one week following your procedure. (Examples of these types of medications are: Advil,  Arthrotec, Aleve, Coumadin, Ecotrin, Heparin, Ibuprofen, Indocin, Motrin, Naprosyn, Nuprin, Plavix, Vioxx, and Voltarin, or their generic forms.  This list is not all-inclusive.  Check with your physician or pharmacist before resuming medications.)   __ Eat a soft diet today.  Avoid foods that are poorly digested for the next 24 hours.  These foods would include: nuts, beans, lettuce, red meats, and fried foods. Start with liquids and advance your diet as tolerated, gradually work up to eating solids.   __ Do not have a Barium Study or Enema for one week.    Your physician recommends the additional following instructions:    -You have a contact number available for emergencies. The signs and symptoms of potential delayed complications were discussed with you. You may return to normal activities tomorrow.  -Resume your previous diet.  -Continue your present medications.   -We are waiting for your pathology results.  -Your physician has recommended a repeat colonoscopy (date to be determined after pending pathology results are reviewed) for surveillance based on pathology results.  -The findings and recommendations have been discussed with you.  -The findings and recommendations were discussed with your family.  - Please see Medication Reconciliation Form for new medication/medications prescribed.       If you experience any problems or have any questions following discharge from the GI Lab, please call:    Cedric Dunn MD  [phone]      Nurse Signature                                                                        Date___________________                                                                            Patient/Responsible Party Signature                                        Date___________________

## 2024-10-01 DIAGNOSIS — E55.9 VITAMIN D DEFICIENCY: ICD-10-CM

## 2024-10-02 RX ORDER — ASPIRIN 325 MG
50000 TABLET, DELAYED RELEASE (ENTERIC COATED) ORAL
Qty: 4 CAPSULE | Refills: 3 | Status: SHIPPED | OUTPATIENT
Start: 2024-10-06

## 2024-10-04 DIAGNOSIS — Z98.84 BARIATRIC SURGERY STATUS: ICD-10-CM

## 2024-10-07 RX ORDER — CYANOCOBALAMIN 1000 UG/ML
1000 INJECTION, SOLUTION INTRAMUSCULAR; SUBCUTANEOUS
Qty: 1 ML | Refills: 3 | Status: SHIPPED | OUTPATIENT
Start: 2024-10-07

## 2024-10-07 NOTE — TELEPHONE ENCOUNTER
Pt called requesting refill of cyanocobalamin. Noted sent to pharmacy today. Call placed to pt to make aware. No answer, voicemail left with update.

## 2024-11-07 ENCOUNTER — APPOINTMENT (OUTPATIENT)
Dept: PRIMARY CARE | Facility: CLINIC | Age: 44
End: 2024-11-07
Payer: COMMERCIAL

## 2024-11-07 VITALS
TEMPERATURE: 96.5 F | SYSTOLIC BLOOD PRESSURE: 132 MMHG | WEIGHT: 250 LBS | BODY MASS INDEX: 40.18 KG/M2 | HEART RATE: 77 BPM | OXYGEN SATURATION: 98 % | HEIGHT: 66 IN | DIASTOLIC BLOOD PRESSURE: 86 MMHG

## 2024-11-07 DIAGNOSIS — E11.42 TYPE 2 DIABETES MELLITUS WITH DIABETIC POLYNEUROPATHY, WITHOUT LONG-TERM CURRENT USE OF INSULIN: ICD-10-CM

## 2024-11-07 DIAGNOSIS — Z98.84 S/P BARIATRIC SURGERY: ICD-10-CM

## 2024-11-07 DIAGNOSIS — M25.511 CHRONIC PAIN OF BOTH SHOULDERS: ICD-10-CM

## 2024-11-07 DIAGNOSIS — M54.2 NECK PAIN: ICD-10-CM

## 2024-11-07 DIAGNOSIS — G89.29 CHRONIC PAIN OF BOTH SHOULDERS: ICD-10-CM

## 2024-11-07 DIAGNOSIS — M72.2 PLANTAR FASCIITIS: ICD-10-CM

## 2024-11-07 DIAGNOSIS — Z98.84 BARIATRIC SURGERY STATUS: ICD-10-CM

## 2024-11-07 DIAGNOSIS — D50.0 IRON DEFICIENCY ANEMIA DUE TO CHRONIC BLOOD LOSS: Primary | ICD-10-CM

## 2024-11-07 DIAGNOSIS — R23.2 HOT FLASHES: ICD-10-CM

## 2024-11-07 DIAGNOSIS — M25.512 CHRONIC PAIN OF BOTH SHOULDERS: ICD-10-CM

## 2024-11-07 DIAGNOSIS — E78.5 HYPERLIPIDEMIA, UNSPECIFIED HYPERLIPIDEMIA TYPE: ICD-10-CM

## 2024-11-07 RX ORDER — CYANOCOBALAMIN 1000 UG/ML
1000 INJECTION, SOLUTION INTRAMUSCULAR; SUBCUTANEOUS
Qty: 1 ML | Refills: 11 | Status: SHIPPED | OUTPATIENT
Start: 2024-11-07

## 2024-11-07 RX ORDER — ACETAMINOPHEN 500 MG
1000 TABLET ORAL EVERY 6 HOURS PRN
Qty: 300 TABLET | Refills: 3 | Status: SHIPPED | OUTPATIENT
Start: 2024-11-07

## 2024-11-07 RX ORDER — GABAPENTIN 300 MG/1
300 CAPSULE ORAL 3 TIMES DAILY
Qty: 60 CAPSULE | Refills: 11 | Status: SHIPPED | OUTPATIENT
Start: 2024-11-07

## 2024-11-07 RX ORDER — DULAGLUTIDE 0.75 MG/.5ML
0.75 INJECTION, SOLUTION SUBCUTANEOUS
Qty: 4 EACH | Refills: 0 | Status: SHIPPED | OUTPATIENT
Start: 2024-11-10

## 2024-11-07 RX ORDER — PEDIATRIC MULTIVITAMIN NO.238
1 TABLET,CHEWABLE ORAL 2 TIMES DAILY
Qty: 60 EACH | Refills: 11 | Status: SHIPPED | OUTPATIENT
Start: 2024-11-07

## 2024-11-07 RX ORDER — DICLOFENAC SODIUM 10 MG/G
4 GEL TOPICAL 4 TIMES DAILY
Qty: 450 G | Refills: 3 | Status: SHIPPED | OUTPATIENT
Start: 2024-11-07

## 2024-11-07 RX ORDER — OMEPRAZOLE 40 MG/1
40 CAPSULE, DELAYED RELEASE ORAL DAILY
Qty: 30 CAPSULE | Refills: 11 | Status: SHIPPED | OUTPATIENT
Start: 2024-11-07 | End: 2025-11-07

## 2024-11-07 ASSESSMENT — PROMIS GLOBAL HEALTH SCALE
RATE_MENTAL_HEALTH: VERY GOOD
CARRYOUT_SOCIAL_ACTIVITIES: VERY GOOD
EMOTIONAL_PROBLEMS: NEVER
RATE_QUALITY_OF_LIFE: VERY GOOD
RATE_GENERAL_HEALTH: FAIR
CARRYOUT_PHYSICAL_ACTIVITIES: MOSTLY
RATE_PHYSICAL_HEALTH: GOOD
RATE_AVERAGE_PAIN: 7
RATE_SOCIAL_SATISFACTION: EXCELLENT

## 2024-11-07 ASSESSMENT — PAIN SCALES - GENERAL: PAINLEVEL_OUTOF10: 0-NO PAIN

## 2024-11-07 NOTE — PATIENT INSTRUCTIONS
Thank you for coming in today!   This is a summarized list of things we discussed today and next steps:  - Please schedule an appointment with plastic surgery for breast reduction evaluation  - Please schedule an appointment with podiatry for shoe fitting  - Please start doing the exercises for plantar fasciitis  - Please start taking diclofenac gel and tylenol for pain  - Please  Trulicity for diabetes and weight loss  - Please go to the lab to get blood and/or urine tests.  - I will leave a comment on MyChart if results are normal and I will call if any changes in management is required.  - I will call you for management of hot flashes  - Please read any attached handouts and medication list  - Please schedule a follow-up apt with me in 1 months. Please call my office at [phone] with any questions.

## 2024-11-07 NOTE — PROGRESS NOTES
"Subjective   Patient ID: Sheri Santos is a 44 y.o. female with PMH of obesity s/p bariatric surgery, T2DM, s/p hysterectomy, HTN,  and HLD who presents for pain in shoulders bilaterally, back and heels bilaterally.     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   -Plastic surgery: thinking about breast reduction surgery  -Physical therapy: doesn't have the time due to work     Foot pain  -description:  throbbing pain, difficulty walking in morning  -location: localized to her plantar heel   -has had this pain for years, even prior to her gastric bypass surgery    -saw podiatrist for pain  -got shots in heel for pain, but that did not help   -also was give gabapentin, but did not help  -takes gabapentin in morning and before sleeping  -tried new shoes (similar to crocs, but with more support), but didn't help  -sometimes uses tylenol for pain  -noticed that the day after her B12 shot she does not have heel pain  -denies numbness, tingling, pins and need pain      Hot flashes   -wakes up at night about 3-4x per week due hot flashes  -feels cold often during the day  -only takes multivitamin 1x per day, rather than 2x    Sleep Disturbances   -the melatonin pills are not helping her sleep, so she stopped taking them      Diabetes  -diet: \"better\", seafood, salad, broccoli, skips breakfast, sub for lunch   -exercise: gets a lot of steps at work     Review of Systems  All other systems reviewed were negative    Current Outpatient Medications on File Prior to Visit   Medication Sig Dispense Refill    cholecalciferol (Vitamin D-3) 50,000 unit capsule TAKE 1 CAPSULE (50225 UNITS) BY MOUTH ONE TIME PER WEEK 4 capsule 3    ferrous sulfate, 325 mg ferrous sulfate, tablet Take 1 tablet by mouth 2 times " "a day with meals. 90 tablet 3    [DISCONTINUED] acetaminophen (Tylenol) 500 mg tablet TAKE 2 TABLETS (1,000 MG) BY MOUTH EVERY 6 HOURS IF NEEDED FOR MODERATE PAIN (4 - 6) OR MILD PAIN (1 - 3). EVERY 4 TO 6 HOURS 60 tablet 3    [DISCONTINUED] cyanocobalamin (Vitamin B-12) 1,000 mcg/mL injection INJECT 1 ML (1,000 MCG) INTO THE MUSCLE EVERY 30 (THIRTY) DAYS. 1 mL 3    [DISCONTINUED] gabapentin (Neurontin) 300 mg capsule Take 1 capsule (300 mg) by mouth 3 times a day. 60 capsule 2    [DISCONTINUED] melatonin 3 mg tablet TAKE 3 MG BY MOUTH ONCE DAILY AT BEDTIME. 30 tablet 11    [DISCONTINUED] multivit with min-folic acid (Daily Gummies) 200 mcg tablet,chewable Chew 1 tablet 2 times a day. 60 each 3    [DISCONTINUED] omeprazole (PriLOSEC) 40 mg DR capsule Take 1 capsule (40 mg) by mouth once daily. (Patient not taking: Reported on 7/26/2024) 30 capsule 11     No current facility-administered medications on file prior to visit.        Objective   Vitals: /86 (BP Location: Left arm, Patient Position: Sitting, BP Cuff Size: Adult long)   Pulse 77   Temp 35.8 °C (96.5 °F) (Temporal)   Ht 1.676 m (5' 6\")   Wt 113 kg (250 lb)   LMP  (LMP Unknown)   SpO2 98%   BMI 40.35 kg/m²      Physical Exam  General: well-appearing, no acute distress  Cardiac: regular rate and rhythm   Lungs: normal work of breathing, lungs clear to auscultation, no wheezing or rales   Abdomen: soft, non-tender, non-distended  Neuro: grossly intact  MSK: No peripheral edema, full active range of motion in upper and lower extremities  Back: indentation in shoulders from bra straps, no tenderness to palpation in back, active back extension and flexion without pain   Foot: thickening of heels bilaterally, tenderness to deep palpation of heel and mild tenderness in achilles tendon     Assessment/Plan     44 y.o. female with PMH of obesity s/p bariatric surgery, T2DM, s/p hysterectomy, HTN,  and HLD who presents for pain in shoulders bilaterally, " back and heels bilaterally. Mildly elevated BP likely white coat otheriwse HDS and ASX.    Back Pain  :: differential: mechanical injury, MSK (poor posture, weight related)  :: unlikely mechanical injury due to lack of pain to palpation, swelling, and signs of injury  :: likely musculoskeletal due to poor posture, lack of exercise, and large breasts causing pressure on her back with marks of bra indentation on skin  - reiterated importance of PT even with lack of time  - refer to plastic The EKG obtained in clinic was independently interpreted by myself. It demonstrates regular rhythm with a ventricular rate of 75. Normal axis. Normal Intervals. ST segments showed no depression/elevation. Stable compared to prior EKG from 9/3/2023.      Foot pain  :: differential: diabetic neuropathy, B12 deficiency, plantar fasciitis, achilles tendonopathy, injury or mechanical injury from shoes  :: unlikely injury related due to long history  :: unlikely solely achilles tendonopathy due to tenderness on plantar heel  :: likely due to a combination of plantar fasciitis and B12 deficiency related neuropathy because of symptoms   -podiatry for shoe fitting  - stretches for plantar fasciitis  - for plantar fasciitis    Hot flashes  - Will review evidence for best practice iso of hx of bariatric surgery vs. Refer to OBGYN    DM-2  :: not controlled, did not tolerate metformin  - start Trulicity, will also help with wt loss, educated on SE    Rest of labs needed per below:  Problem List Items Addressed This Visit             ICD-10-CM    Bariatric surgery status Z98.84    Relevant Medications    cyanocobalamin (Vitamin B-12) 1,000 mcg/mL injection    multivit with min-folic acid (Daily Gummies) 200 mcg tablet,chewable    omeprazole (PriLOSEC) 40 mg DR capsule    Diabetes mellitus (Multi) E11.9    Relevant Medications    dulaglutide (Trulicity) 0.75 mg/0.5 mL pen injector (Start on 11/10/2024)    gabapentin (Neurontin) 300 mg capsule     Other Relevant Orders    Albumin-Creatinine Ratio, Urine Random    Comprehensive Metabolic Panel    Hemoglobin A1C    Hyperlipidemia E78.5    Relevant Orders    Lipid Panel    Iron deficiency anemia - Primary D50.9    Relevant Orders    CBC and Auto Differential    Iron and TIBC    Ferritin     Other Visit Diagnoses         Codes    Neck pain     M54.2    Relevant Medications    acetaminophen (Tylenol) 500 mg tablet    Chronic pain of both shoulders     M25.511, G89.29, M25.512    Relevant Medications    acetaminophen (Tylenol) 500 mg tablet    Other Relevant Orders    Referral to Plastic Surgery    Hot flashes     R23.2    Relevant Orders    Thyroid Stimulating Hormone    S/P bariatric surgery     Z98.84    Relevant Orders    Vitamin B12    Plantar fasciitis     M72.2    Relevant Medications    diclofenac sodium (Voltaren) 1 % gel    Other Relevant Orders    Referral to Podiatry              Attending Supervision: seen and discussed with attending physician (cosigner listed on this note).    RTC in 1 month for Trulicity dose adjustment, or earlier as needed.    Colette Reyna, MS3    I saw and evaluated the patient. I personally obtained the key and critical portions of the history and physical exam. I reviewed and modified the student's documentation and discussed patient with the medical student. I agree with the above documentation and medical decision making.     Patient seen and discussed with attending physician Dr. Tao    Plan preliminary until cosigned by attending physician.    Gisel Fair M.D.  Family Medicine  PGY-2

## 2024-11-18 ENCOUNTER — APPOINTMENT (OUTPATIENT)
Dept: PRIMARY CARE | Facility: CLINIC | Age: 44
End: 2024-11-18
Payer: COMMERCIAL

## 2024-11-18 DIAGNOSIS — Z98.84 BARIATRIC SURGERY STATUS: ICD-10-CM

## 2024-11-18 DIAGNOSIS — G89.29 CHRONIC PAIN OF BOTH SHOULDERS: Primary | ICD-10-CM

## 2024-11-18 DIAGNOSIS — M25.511 CHRONIC PAIN OF BOTH SHOULDERS: Primary | ICD-10-CM

## 2024-11-18 DIAGNOSIS — M25.512 CHRONIC PAIN OF BOTH SHOULDERS: Primary | ICD-10-CM

## 2024-11-18 PROCEDURE — 3048F LDL-C <100 MG/DL: CPT

## 2024-11-18 PROCEDURE — 99213 OFFICE O/P EST LOW 20 MIN: CPT

## 2024-11-18 PROCEDURE — 3044F HG A1C LEVEL LT 7.0%: CPT

## 2024-11-18 RX ORDER — LIDOCAINE 50 MG/G
1 PATCH TOPICAL DAILY
Qty: 30 PATCH | Refills: 11 | Status: SHIPPED | OUTPATIENT
Start: 2024-11-18 | End: 2025-11-18

## 2024-11-18 RX ORDER — BISMUTH SUBSALICYLATE 262 MG
1 TABLET,CHEWABLE ORAL DAILY
Qty: 30 TABLET | Refills: 11 | Status: SHIPPED | OUTPATIENT
Start: 2024-11-18 | End: 2025-11-18

## 2024-11-18 NOTE — PROGRESS NOTES
Subjective   Patient ID: Sheri Santos is a 44 y.o. female with who presents for No chief complaint on file..    HPI    The encounter below was completed via telephone communication due to patient preference and/or scheduling necessity. Patient lacked appropriate connectivity for video telemedicine. Confirmed with patient that they are in a private setting before discussing sensitive health information. Consent for treatment and billing for this visit was obtained during the visit. Patient was appropriately identified using two identifiers.    Reviewed benefits and side effects of Trulicity with patient    Reports upper back pain      Review of Systems  All other systems reviewed were negative    Current Outpatient Medications on File Prior to Visit   Medication Sig Dispense Refill    acetaminophen (Tylenol) 500 mg tablet Take 2 tablets (1,000 mg) by mouth every 6 hours if needed for moderate pain (4 - 6) or mild pain (1 - 3). Every 4 to 6 hours 300 tablet 3    cholecalciferol (Vitamin D-3) 50,000 unit capsule TAKE 1 CAPSULE (83033 UNITS) BY MOUTH ONE TIME PER WEEK 4 capsule 3    cyanocobalamin (Vitamin B-12) 1,000 mcg/mL injection Inject 1 mL (1,000 mcg) into the muscle every 30 (thirty) days. 1 mL 11    diclofenac sodium (Voltaren) 1 % gel Apply 4.5 inches (4 g) topically 4 times a day. 450 g 3    dulaglutide (Trulicity) 0.75 mg/0.5 mL pen injector Inject 0.75 mg under the skin 1 (one) time per week. 4 each 0    ferrous sulfate, 325 mg ferrous sulfate, tablet Take 1 tablet by mouth 2 times a day with meals. 90 tablet 3    gabapentin (Neurontin) 300 mg capsule Take 1 capsule (300 mg) by mouth 3 times a day. 60 capsule 11    multivit with min-folic acid (Daily Gummies) 200 mcg tablet,chewable Chew 1 tablet 2 times a day. 60 each 11    omeprazole (PriLOSEC) 40 mg DR capsule Take 1 capsule (40 mg) by mouth once daily. 30 capsule 11     No current facility-administered medications on file prior to visit.         Objective   Vitals: LMP  (LMP Unknown)      Physical Exam  Oriented x 3    Assessment/Plan   44-year-old here for follow-up.  Plan to start Trulicity for prediabetes and weight loss.  Will prescribe lidocaine patches for back pain as well as Tylenol (caution with NSAIDs as patient has a history of bariatric surgery), follow-up with plastic for possible breast reduction and reiterated the importance of physical therapy for back strengthening.  Ordered new multivitamin as per previous note was not covered by insurance, informed patient if issues and need prior auth, I am happy to fill it.      Problem List Items Addressed This Visit             ICD-10-CM    Bariatric surgery status Z98.84    Relevant Medications    multivitamin tablet     Other Visit Diagnoses         Codes    Chronic pain of both shoulders    -  Primary M25.511, G89.29, M25.512    Relevant Medications    lidocaine (Lidoderm) 5 % patch            Attending Supervision: discussed with attending physician (cosigner listed on this note).    RTC in 1mo for Trulicity dose increase, or earlier as needed.    Patient discussed with attending physician Dr. Li    Plan preliminary until cosigned by attending physician.    Gisel Fair M.D.  Family Medicine  PGY-2

## 2024-11-19 NOTE — PROGRESS NOTES
I saw and evaluated the patient. I personally obtained the key and critical portions of the history and physical exam or was physically present for key and critical portions performed by the resident/fellow. I reviewed the resident/fellow's documentation and discussed the patient with the resident/fellow. I agree with the resident/fellow's medical decision making as documented in the note.    After bariatric surgery, estrogen and progesterone will not be absorbed/ not undergo enteroheptic recirculation in the usual manner, so may not be the indicated treatment for hot flushes of menopause.  She preerred to consult with gynecologist about treatment.   Already uses gabapentin, I believe.     Hortensia Tao MD

## 2024-12-09 ENCOUNTER — OFFICE VISIT (OUTPATIENT)
Dept: PRIMARY CARE | Facility: CLINIC | Age: 44
End: 2024-12-09
Payer: COMMERCIAL

## 2024-12-09 VITALS
DIASTOLIC BLOOD PRESSURE: 82 MMHG | HEART RATE: 87 BPM | TEMPERATURE: 97 F | HEIGHT: 66 IN | SYSTOLIC BLOOD PRESSURE: 138 MMHG | WEIGHT: 251 LBS | OXYGEN SATURATION: 96 % | BODY MASS INDEX: 40.34 KG/M2

## 2024-12-09 DIAGNOSIS — Z98.84 BARIATRIC SURGERY STATUS: ICD-10-CM

## 2024-12-09 DIAGNOSIS — E11.42 TYPE 2 DIABETES MELLITUS WITH DIABETIC POLYNEUROPATHY, WITHOUT LONG-TERM CURRENT USE OF INSULIN: ICD-10-CM

## 2024-12-09 PROCEDURE — 3079F DIAST BP 80-89 MM HG: CPT

## 2024-12-09 PROCEDURE — 1036F TOBACCO NON-USER: CPT

## 2024-12-09 PROCEDURE — 3044F HG A1C LEVEL LT 7.0%: CPT

## 2024-12-09 PROCEDURE — 3008F BODY MASS INDEX DOCD: CPT

## 2024-12-09 PROCEDURE — 99213 OFFICE O/P EST LOW 20 MIN: CPT

## 2024-12-09 PROCEDURE — 3048F LDL-C <100 MG/DL: CPT

## 2024-12-09 PROCEDURE — 3075F SYST BP GE 130 - 139MM HG: CPT

## 2024-12-09 RX ORDER — PEDIATRIC MULTIVITAMIN NO.238
1 TABLET,CHEWABLE ORAL 2 TIMES DAILY
Qty: 60 EACH | Refills: 11 | Status: SHIPPED | OUTPATIENT
Start: 2024-12-09

## 2024-12-09 RX ORDER — DULAGLUTIDE 1.5 MG/.5ML
1.5 INJECTION, SOLUTION SUBCUTANEOUS WEEKLY
Qty: 4 EACH | Refills: 1 | Status: SHIPPED | OUTPATIENT
Start: 2024-12-09 | End: 2025-02-03

## 2024-12-09 RX ORDER — ISOPROPYL ALCOHOL 70 ML/100ML
1 SWAB TOPICAL EVERY 6 HOURS PRN
Qty: 100 EACH | Refills: 3 | Status: SHIPPED | OUTPATIENT
Start: 2024-12-09

## 2024-12-09 RX ORDER — PEN NEEDLE, DIABETIC 30 GX3/16"
NEEDLE, DISPOSABLE MISCELLANEOUS
Qty: 100 EACH | Refills: 11 | Status: SHIPPED | OUTPATIENT
Start: 2024-12-09 | End: 2024-12-12 | Stop reason: ENTERED-IN-ERROR

## 2024-12-09 ASSESSMENT — PAIN SCALES - GENERAL: PAINLEVEL_OUTOF10: 8

## 2024-12-09 NOTE — PROGRESS NOTES
Subjective   Patient ID: Sheri Santos is a 44 y.o. female with who presents for Follow-up.    HPI    Diabetes/obesity  -Tolerating Trulicity 0.75 weekly without side effects  -Weight 251 lbs stable at this time    Requested swabs and-year-old for vitamin B12 injections the setting of bariatric surgery (ordered)    Plantar fasciitis  -Reports persistent pain, continues to do the stretching exercises and is planning to follow-up with podiatry    Review of Systems  All other systems reviewed were negative    Current Outpatient Medications on File Prior to Visit   Medication Sig Dispense Refill    acetaminophen (Tylenol) 500 mg tablet Take 2 tablets (1,000 mg) by mouth every 6 hours if needed for moderate pain (4 - 6) or mild pain (1 - 3). Every 4 to 6 hours 300 tablet 3    cholecalciferol (Vitamin D-3) 50,000 unit capsule TAKE 1 CAPSULE (84368 UNITS) BY MOUTH ONE TIME PER WEEK 4 capsule 3    cyanocobalamin (Vitamin B-12) 1,000 mcg/mL injection Inject 1 mL (1,000 mcg) into the muscle every 30 (thirty) days. 1 mL 11    diclofenac sodium (Voltaren) 1 % gel Apply 4.5 inches (4 g) topically 4 times a day. 450 g 3    dulaglutide (Trulicity) 0.75 mg/0.5 mL pen injector Inject 0.75 mg under the skin 1 (one) time per week. 4 each 0    ferrous sulfate, 325 mg ferrous sulfate, tablet Take 1 tablet by mouth 2 times a day with meals. 90 tablet 3    gabapentin (Neurontin) 300 mg capsule Take 1 capsule (300 mg) by mouth 3 times a day. 60 capsule 11    lidocaine (Lidoderm) 5 % patch Place 1 patch over 12 hours on the skin once daily. Apply to painful area 12 hours per day, remove for 12 hours. 30 patch 11    multivit with min-folic acid (Daily Gummies) 200 mcg tablet,chewable Chew 1 tablet 2 times a day. 60 each 11    multivitamin tablet Take 1 tablet by mouth once daily. 30 tablet 11    omeprazole (PriLOSEC) 40 mg DR capsule Take 1 capsule (40 mg) by mouth once daily. 30 capsule 11     No current facility-administered medications  "on file prior to visit.        Objective   Vitals: /82 (BP Location: Right arm, Patient Position: Sitting, BP Cuff Size: Adult)   Pulse 87   Temp 36.1 °C (97 °F) (Temporal)   Ht 1.676 m (5' 6\")   Wt 114 kg (251 lb)   LMP  (LMP Unknown)   SpO2 96%   BMI 40.51 kg/m²      Physical Exam  General: well-appearing, NAD  HEENT: NC/AT  Cardiac: rrr, no m/r/g  Lungs: normal work of breathing, CTAB  Abdomen: soft, non-tender, non-distended, BS present  Neuro: grossly intact  MSK: No peripheral edema, cyanosis, or pallor  Psych: no depression, anxiety      Assessment/Plan     44 y.o. female with PMH of obesity s/p bariatric surgery, T2DM, s/p hysterectomy, HTN,  and HLD who presents for follow-up.  Tolerating Trulicity therefore will increase the dosage to 1.5, explained that weight loss will only be significant once were at max dose consistently.  Plantar fasciitis poorly controlled, recommended supportive shoes, continuing stretching exercises and follow-up with podiatry for possible injections.    Problem List Items Addressed This Visit             ICD-10-CM    Bariatric surgery status Z98.84    Relevant Medications    pen needle, diabetic 31 gauge x 5/16\" needle    multivit with min-folic acid (Daily Gummies) 200 mcg tablet,chewable    alcohol swabs (Alcohol Prep Pads) pads, medicated    Diabetes mellitus (Multi) E11.9    Relevant Medications    dulaglutide (Trulicity) 1.5 mg/0.5 mL pen injector injection       Attending Supervision: discussed with attending physician (cosigner listed on this note).    RTC in 1 month for Trulicity dose increase, or earlier as needed.    Patient seen and discussed with attending physician Dr. Epstein    Plan preliminary until cosigned by attending physician.    Gisel Fair M.D.  Family Medicine  PGY-2  "

## 2024-12-12 ENCOUNTER — APPOINTMENT (OUTPATIENT)
Dept: PRIMARY CARE | Facility: CLINIC | Age: 44
End: 2024-12-12
Payer: COMMERCIAL

## 2024-12-12 DIAGNOSIS — Z98.84 BARIATRIC SURGERY STATUS: Primary | ICD-10-CM

## 2024-12-12 RX ORDER — ACETAMINOPHEN 160 MG/5ML
SUSPENSION, ORAL (FINAL DOSE FORM) ORAL
Qty: 100 EACH | Refills: 1 | Status: SHIPPED | OUTPATIENT
Start: 2024-12-12 | End: 2025-12-12

## 2025-01-14 ENCOUNTER — TELEPHONE (OUTPATIENT)
Facility: HOSPITAL | Age: 45
End: 2025-01-14
Payer: COMMERCIAL

## 2025-01-14 NOTE — TELEPHONE ENCOUNTER
Patient called in requesting if Dr. Fair could contact her pharmacy and increase dosage of Trulicity. Patient stated that at her last visit it was discussed to change dosage. Patient also stated the pharmacy never received prescription for chewable gummies that was also discussed. Patient is requesting a call from dr. Fair. Please advise. Thanks!!

## 2025-01-23 DIAGNOSIS — E55.9 VITAMIN D DEFICIENCY: ICD-10-CM

## 2025-01-23 NOTE — TELEPHONE ENCOUNTER
Attempted to call patient to clarify however unable to go through as patient has a system and have found is blocking my call.  Will send a message in [facility]

## 2025-01-27 RX ORDER — ASPIRIN 325 MG
50000 TABLET, DELAYED RELEASE (ENTERIC COATED) ORAL
Qty: 4 CAPSULE | Refills: 3 | Status: SHIPPED | OUTPATIENT
Start: 2025-02-02

## 2025-02-06 ENCOUNTER — APPOINTMENT (OUTPATIENT)
Dept: PRIMARY CARE | Facility: CLINIC | Age: 45
End: 2025-02-06
Payer: COMMERCIAL

## 2025-02-06 VITALS
SYSTOLIC BLOOD PRESSURE: 115 MMHG | HEART RATE: 83 BPM | TEMPERATURE: 96.4 F | BODY MASS INDEX: 39.58 KG/M2 | WEIGHT: 246.3 LBS | DIASTOLIC BLOOD PRESSURE: 80 MMHG | OXYGEN SATURATION: 100 % | HEIGHT: 66 IN

## 2025-02-06 DIAGNOSIS — M25.512 CHRONIC PAIN OF BOTH SHOULDERS: ICD-10-CM

## 2025-02-06 DIAGNOSIS — M25.511 CHRONIC PAIN OF BOTH SHOULDERS: ICD-10-CM

## 2025-02-06 DIAGNOSIS — G89.29 CHRONIC PAIN OF BOTH SHOULDERS: ICD-10-CM

## 2025-02-06 DIAGNOSIS — E11.42 TYPE 2 DIABETES MELLITUS WITH DIABETIC POLYNEUROPATHY, WITHOUT LONG-TERM CURRENT USE OF INSULIN: Primary | ICD-10-CM

## 2025-02-06 PROCEDURE — 99213 OFFICE O/P EST LOW 20 MIN: CPT

## 2025-02-06 PROCEDURE — 3074F SYST BP LT 130 MM HG: CPT

## 2025-02-06 PROCEDURE — 3008F BODY MASS INDEX DOCD: CPT

## 2025-02-06 PROCEDURE — 3079F DIAST BP 80-89 MM HG: CPT

## 2025-02-06 PROCEDURE — 1036F TOBACCO NON-USER: CPT

## 2025-02-06 PROCEDURE — 99213 OFFICE O/P EST LOW 20 MIN: CPT | Mod: GE

## 2025-02-06 RX ORDER — METHOCARBAMOL 500 MG/1
500 TABLET, FILM COATED ORAL 4 TIMES DAILY PRN
Qty: 90 TABLET | Refills: 0 | Status: SHIPPED | OUTPATIENT
Start: 2025-02-06 | End: 2025-03-08

## 2025-02-06 ASSESSMENT — PAIN SCALES - GENERAL: PAINLEVEL_OUTOF10: 8

## 2025-02-06 ASSESSMENT — ENCOUNTER SYMPTOMS: DEPRESSION: 0

## 2025-02-06 ASSESSMENT — PATIENT HEALTH QUESTIONNAIRE - PHQ9
2. FEELING DOWN, DEPRESSED OR HOPELESS: NOT AT ALL
SUM OF ALL RESPONSES TO PHQ9 QUESTIONS 1 AND 2: 0
1. LITTLE INTEREST OR PLEASURE IN DOING THINGS: NOT AT ALL

## 2025-02-06 NOTE — PATIENT INSTRUCTIONS
Thank you for coming in today!   This is a summarized list of things we discussed today and next steps:  - Please start taking trulicity 3 mg weekly and robaxin 500 mg 4 times daily as needed for upper back pain   - Please go to the lab to get blood and/or urine tests   - I will leave a comment on MyChart if results are normal and I will call if any changes in management is required.  - Please read any attached handouts and medication list  - Please schedule a follow-up apt with me in 1 months. Please call my office at [phone] with any questions.

## 2025-02-06 NOTE — PROGRESS NOTES
I reviewed the resident/fellow's documentation and discussed the patient with the resident/fellow. I agree with the resident/fellow's medical decision making as documented in the note.  Needs injected Vitamin B12 as ordered.    Hortensia Tao MD

## 2025-02-06 NOTE — PROGRESS NOTES
Subjective   Patient ID: Sheri Santos is a 44 y.o. female who presents for Follow-up.    HPI    DM-2  - meds: Trulicity tolerating well  - losing weight on Trulicity 12/2024 114 kg ->112 kg 2/2025   - c/w diet changes WFP[employer] mostly and protein chicken-turkey-seafood, avoid fast food, no pop  - exercise walking mostly at work   - missing doses?: no  - reports neuropathy in legs improved, reports polydipsia  - denies polyuria, vision changes    Back pain  Persistent back pain  Worse at night after work   BL upper back   No numbness, tingling, in arms   Did not try focus PT but does some exercises occasionally with cousins PT  Tylenol, lidocaine patches, Voltaren gel did not help  Gabapentin doesn't help but takes it as needed for feet neuropathy  Stress incontinence but otherwise no fecal/urinary incontinence  No saddle anesthesia      Stress incontiennce  Uses Kegel balls  Doing kegel exercises  Which is helping    Review of Systems  All other systems reviewed were negative    Current Outpatient Medications on File Prior to Visit   Medication Sig Dispense Refill    acetaminophen (Tylenol) 500 mg tablet Take 2 tablets (1,000 mg) by mouth every 6 hours if needed for moderate pain (4 - 6) or mild pain (1 - 3). Every 4 to 6 hours 300 tablet 3    alcohol swabs (Alcohol Prep Pads) pads, medicated Apply 1 each topically every 6 hours if needed (blood sugar checks). 100 each 3    cholecalciferol (Vitamin D-3) 50,000 unit capsule TAKE 1 CAPSULE (14519 UNITS) BY MOUTH ONE TIME PER WEEK 4 capsule 3    cyanocobalamin (Vitamin B-12) 1,000 mcg/mL injection Inject 1 mL (1,000 mcg) into the muscle every 30 (thirty) days. 1 mL 11    diclofenac sodium (Voltaren) 1 % gel Apply 4.5 inches (4 g) topically 4 times a day. 450 g 3    ferrous sulfate, 325 mg ferrous sulfate, tablet Take 1 tablet by mouth 2 times a day with meals. 90 tablet 3    gabapentin (Neurontin) 300 mg capsule Take 1 capsule (300 mg) by mouth 3 times a day. 60 capsule  "11    lidocaine (Lidoderm) 5 % patch Place 1 patch over 12 hours on the skin once daily. Apply to painful area 12 hours per day, remove for 12 hours. 30 patch 11    multivit with min-folic acid (Daily Gummies) 200 mcg tablet,chewable Chew 1 tablet 2 times a day. 60 each 11    omeprazole (PriLOSEC) 40 mg DR capsule Take 1 capsule (40 mg) by mouth once daily. 30 capsule 11    insulin syringe-needle U-100 30G X 1/2\" 0.3 mL syringe Use to inject 1-4 times daily as directed. (Patient not taking: Reported on 2/6/2025) 100 each 1    [DISCONTINUED] dulaglutide (Trulicity) 1.5 mg/0.5 mL pen injector injection Inject 1.5 mg under the skin 1 (one) time per week. 4 each 1     No current facility-administered medications on file prior to visit.        Objective   Vitals: /80 (BP Location: Right arm, Patient Position: Sitting)   Pulse 83   Temp 35.8 °C (96.4 °F) (Temporal)   Ht 1.676 m (5' 6\")   Wt 112 kg (246 lb 4.8 oz)   LMP  (LMP Unknown)   SpO2 100%   BMI 39.75 kg/m²      Physical Exam  General: well-appearing, NAD, obese  HEENT: NC/AT  Cardiac: Perfusing well  Lungs: normal work of breathing  Abdomen: non-distended  Neuro: grossly intact  Back: Spinal tenderness, paraspinal tenderness to palpation bilaterally, nl ROM, nl UE strength  Psych: no depression, anxiety      Assessment/Plan     Sheri Santos is a 44 y.o. female with PMH of obesity s/p bariatric surgery, T2DM, s/p hysterectomy, HTN, and HLD who presents for Follow-up. HDS and well appearing.     #DM2  :: chronic, stable  -Increase Trulicity to 3 mg weekly   -needs repeat A1c; encouraged to go get labs/ previously ordered    #Back/ shoulder pain  :: chronic, poorly controlled  - Re-emphasized importance of PT  - Trial robaxin prn    #Obesity  :: chronic, improving  -Weight improving on Trulicity will continue to monitor  -Smart goal to schedule placed 2 x 30 minutes of moderate intensity exercise weekly    #History of bariatric surgery  :: chronic, " stable  -Patient has some insurance information so that I can try to get her approval for Gummies vitamins as she cannot tolerate regular vitamins due to nausea and vitamin D    Problem List Items Addressed This Visit             ICD-10-CM    Diabetes mellitus (Multi) - Primary E11.9    Relevant Medications    dulaglutide (Trulicity) 3 mg/0.5 mL injection     Other Visit Diagnoses         Codes    Chronic pain of both shoulders     M25.511, G89.29, M25.512    Relevant Medications    methocarbamol (Robaxin) 500 mg tablet            Attending Supervision: discussed with attending physician (cosigner listed on this note) Dr. Tao.    RTC in 1 month for trulicity increase and back pain (check if robaxin helps, can continue prn), or earlier as needed.    Plan preliminary until cosigned by attending physician.    Gisel Fair M.D.  Family Medicine  PGY-2

## 2025-03-06 ENCOUNTER — OFFICE VISIT (OUTPATIENT)
Facility: HOSPITAL | Age: 45
End: 2025-03-06
Payer: COMMERCIAL

## 2025-03-06 VITALS
SYSTOLIC BLOOD PRESSURE: 135 MMHG | DIASTOLIC BLOOD PRESSURE: 83 MMHG | WEIGHT: 241.6 LBS | TEMPERATURE: 97.4 F | OXYGEN SATURATION: 98 % | BODY MASS INDEX: 38.83 KG/M2 | HEIGHT: 66 IN | HEART RATE: 85 BPM

## 2025-03-06 DIAGNOSIS — D50.0 IRON DEFICIENCY ANEMIA DUE TO CHRONIC BLOOD LOSS: ICD-10-CM

## 2025-03-06 DIAGNOSIS — M72.2 PLANTAR FASCIITIS: ICD-10-CM

## 2025-03-06 DIAGNOSIS — E78.5 HYPERLIPIDEMIA, UNSPECIFIED HYPERLIPIDEMIA TYPE: ICD-10-CM

## 2025-03-06 DIAGNOSIS — Z12.31 ENCOUNTER FOR SCREENING MAMMOGRAM FOR MALIGNANT NEOPLASM OF BREAST: ICD-10-CM

## 2025-03-06 DIAGNOSIS — E55.9 VITAMIN D DEFICIENCY: ICD-10-CM

## 2025-03-06 DIAGNOSIS — M25.512 CHRONIC PAIN OF BOTH SHOULDERS: ICD-10-CM

## 2025-03-06 DIAGNOSIS — M54.9 UPPER BACK PAIN: ICD-10-CM

## 2025-03-06 DIAGNOSIS — E11.42 TYPE 2 DIABETES MELLITUS WITH DIABETIC POLYNEUROPATHY, WITHOUT LONG-TERM CURRENT USE OF INSULIN: Primary | ICD-10-CM

## 2025-03-06 DIAGNOSIS — Z98.84 BARIATRIC SURGERY STATUS: ICD-10-CM

## 2025-03-06 DIAGNOSIS — M25.511 CHRONIC PAIN OF BOTH SHOULDERS: ICD-10-CM

## 2025-03-06 DIAGNOSIS — G89.29 CHRONIC PAIN OF BOTH SHOULDERS: ICD-10-CM

## 2025-03-06 PROCEDURE — 3079F DIAST BP 80-89 MM HG: CPT

## 2025-03-06 PROCEDURE — 99213 OFFICE O/P EST LOW 20 MIN: CPT

## 2025-03-06 PROCEDURE — 99213 OFFICE O/P EST LOW 20 MIN: CPT | Mod: GE

## 2025-03-06 PROCEDURE — 3008F BODY MASS INDEX DOCD: CPT

## 2025-03-06 PROCEDURE — 3075F SYST BP GE 130 - 139MM HG: CPT

## 2025-03-06 RX ORDER — METHOCARBAMOL 500 MG/1
500 TABLET, FILM COATED ORAL 4 TIMES DAILY PRN
Qty: 90 TABLET | Refills: 0 | Status: SHIPPED | OUTPATIENT
Start: 2025-03-06 | End: 2025-04-05

## 2025-03-06 RX ORDER — FERROUS SULFATE 325(65) MG
1 TABLET ORAL
Qty: 90 TABLET | Refills: 3 | Status: SHIPPED | OUTPATIENT
Start: 2025-03-06

## 2025-03-06 RX ORDER — DULAGLUTIDE 4.5 MG/.5ML
4.5 INJECTION, SOLUTION SUBCUTANEOUS WEEKLY
Qty: 2 ML | Refills: 11 | Status: SHIPPED | OUTPATIENT
Start: 2025-03-06

## 2025-03-06 RX ORDER — DICLOFENAC SODIUM 10 MG/G
4 GEL TOPICAL 4 TIMES DAILY
Qty: 450 G | Refills: 3 | Status: SHIPPED | OUTPATIENT
Start: 2025-03-06

## 2025-03-06 RX ORDER — ISOPROPYL ALCOHOL 70 ML/100ML
1 SWAB TOPICAL EVERY 6 HOURS PRN
Qty: 100 EACH | Refills: 3 | Status: SHIPPED | OUTPATIENT
Start: 2025-03-06

## 2025-03-06 RX ORDER — ASPIRIN 325 MG
50000 TABLET, DELAYED RELEASE (ENTERIC COATED) ORAL
Qty: 4 CAPSULE | Refills: 3 | Status: SHIPPED | OUTPATIENT
Start: 2025-03-09

## 2025-03-06 ASSESSMENT — PAIN SCALES - GENERAL: PAINLEVEL_OUTOF10: 0-NO PAIN

## 2025-03-06 NOTE — PATIENT INSTRUCTIONS
Thank you for coming in today!   This is a summarized list of things we discussed today and next steps:  - Please call to schedule an appointment for Mammogram [phone]  - Please call and schedule an appointment for breast surgery  - Please call to schedule an appointment for [phone]  - Please start taking Trulicity 4.5 mg weekly   - I will leave a comment on MyChart if results are normal and I will call if any changes in management is required.  - Please read any attached handouts and medication list  - Please schedule a follow-up apt with me in 3 months. Please call my office at [phone] with any questions.

## 2025-03-06 NOTE — PROGRESS NOTES
Subjective   Patient ID: Sheri Santos is a 44 y.o. female who presents for Follow-up.    HPI    DM-2  - meds: Trulicity 3 mg tolerating well  - wt continues to decrease  - c/w diet changes   - denies polydipsia, polyuria, neuropathy, vision changes    Back pain  Persistent  Has not called PT du to busy schedule  Muscle relaxor didn't help much    Review of Systems  All other systems reviewed were negative    Current Outpatient Medications on File Prior to Visit   Medication Sig Dispense Refill    acetaminophen (Tylenol) 500 mg tablet Take 2 tablets (1,000 mg) by mouth every 6 hours if needed for moderate pain (4 - 6) or mild pain (1 - 3). Every 4 to 6 hours 300 tablet 3    cyanocobalamin (Vitamin B-12) 1,000 mcg/mL injection Inject 1 mL (1,000 mcg) into the muscle every 30 (thirty) days. 1 mL 11    gabapentin (Neurontin) 300 mg capsule Take 1 capsule (300 mg) by mouth 3 times a day. 60 capsule 11    lidocaine (Lidoderm) 5 % patch Place 1 patch over 12 hours on the skin once daily. Apply to painful area 12 hours per day, remove for 12 hours. 30 patch 11    multivit with min-folic acid (Daily Gummies) 200 mcg tablet,chewable Chew 1 tablet 2 times a day. 60 each 11    omeprazole (PriLOSEC) 40 mg DR capsule Take 1 capsule (40 mg) by mouth once daily. 30 capsule 11    [DISCONTINUED] alcohol swabs (Alcohol Prep Pads) pads, medicated Apply 1 each topically every 6 hours if needed (blood sugar checks). 100 each 3    [DISCONTINUED] cholecalciferol (Vitamin D-3) 50,000 unit capsule TAKE 1 CAPSULE (04445 UNITS) BY MOUTH ONE TIME PER WEEK 4 capsule 3    [DISCONTINUED] diclofenac sodium (Voltaren) 1 % gel Apply 4.5 inches (4 g) topically 4 times a day. 450 g 3    [DISCONTINUED] dulaglutide (Trulicity) 3 mg/0.5 mL injection Inject 3 mg under the skin 1 (one) time per week. 16 each 1    [DISCONTINUED] ferrous sulfate, 325 mg ferrous sulfate, tablet Take 1 tablet by mouth 2 times a day with meals. 90 tablet 3    [DISCONTINUED]  "methocarbamol (Robaxin) 500 mg tablet Take 1 tablet (500 mg) by mouth 4 times a day as needed for muscle spasms. 90 tablet 0    insulin syringe-needle U-100 30G X 1/2\" 0.3 mL syringe Use to inject 1-4 times daily as directed. (Patient not taking: Reported on 3/6/2025) 100 each 1     No current facility-administered medications on file prior to visit.        Objective   Vitals: /83 (BP Location: Right arm, Patient Position: Sitting)   Pulse 85   Temp 36.3 °C (97.4 °F) (Temporal)   Ht 1.676 m (5' 6\")   Wt 110 kg (241 lb 9.6 oz)   LMP  (LMP Unknown)   SpO2 98%   BMI 39.00 kg/m²      Physical Exam  General: well-appearing, NAD  HEENT: NC/AT  Cardiac: Perfusing well  Lungs: normal work of breathing  Abdomen: non-distended  Neuro: grossly intact  MSK: No peripheral edema,   not incredibly recent psych: no depression, anxiety      Assessment/Plan     Sheri Santos is a 44 y.o. female with PMH of obesity s/p bariatric surgery, T2DM, s/p hysterectomy, HTN, and HLD  who presents for Follow-up.  HDS and well-appearing.    #Type 2 diabetes  Chronic, stable  A1c due  Tolerating Trulicity well will increase to max dose 4.5 mg weekly  Continues to lose weight and lifestyle modifications    #Upper back pain  Chronic, poorly controlled  Reinforced importance of physical therapy for upper back strengthening and core muscle strengthening  Will refer to breast surgery per patient's request for breast reduction evaluation    #Health maintenance  Reordered mammogram  Reordered previous labs prior to this transition to [facility] lab    Care source to start prior authorization for gummy vitamins and vitamin D supplementation and will fax the form to them for approval      Problem List Items Addressed This Visit             ICD-10-CM    Bariatric surgery status Z98.84    Relevant Medications    alcohol swabs (Alcohol Prep Pads) pads, medicated    ferrous sulfate tablet    Diabetes mellitus (Multi) - Primary E11.9    Relevant " Medications    dulaglutide (Trulicity) 4.5 mg/0.5 mL pen injector    Other Relevant Orders    Hemoglobin A1C    Lipid Panel    Albumin-Creatinine Ratio, Urine Random    Hyperlipidemia E78.5    Relevant Orders    Lipid Panel    Tsh With Reflex To Free T4 If Abnormal    T4, free    Iron deficiency anemia D50.9    Relevant Orders    CBC and Auto Differential    Iron and TIBC    Ferritin    Vitamin D deficiency E55.9    Relevant Medications    cholecalciferol (Vitamin D-3) 50,000 unit capsule (Start on 3/9/2025)     Other Visit Diagnoses         Codes    Encounter for screening mammogram for malignant neoplasm of breast     Z12.31    Relevant Orders    BI mammo bilateral screening tomosynthesis    Vitamin B12    Comprehensive metabolic panel    Plantar fasciitis     M72.2    Relevant Medications    diclofenac sodium (Voltaren) 1 % gel    Chronic pain of both shoulders     M25.511, G89.29, M25.512    Relevant Medications    methocarbamol (Robaxin) 500 mg tablet    Upper back pain     M54.9    Relevant Orders    Referral to Breast Surgery            Attending Supervision: discussed with attending physician (cosigner listed on this note) Dr. Cruz.    RTC in 3 months for hypertension, diabetes, upper shoulder pain, or earlier as needed.    Plan preliminary until cosigned by attending physician.    Gisel Fair M.D.  Family Medicine  PGY-2

## 2025-03-07 NOTE — PROGRESS NOTES
I reviewed the resident/fellow's documentation and discussed the patient with the resident/fellow. I agree with the resident/fellow's medical decision making as documented in the note.    Srikanth Cruz MD

## 2025-04-02 DIAGNOSIS — M25.511 CHRONIC PAIN OF BOTH SHOULDERS: ICD-10-CM

## 2025-04-02 DIAGNOSIS — M25.512 CHRONIC PAIN OF BOTH SHOULDERS: ICD-10-CM

## 2025-04-02 DIAGNOSIS — G89.29 CHRONIC PAIN OF BOTH SHOULDERS: ICD-10-CM

## 2025-04-04 RX ORDER — METHOCARBAMOL 500 MG/1
500 TABLET, FILM COATED ORAL 4 TIMES DAILY PRN
Qty: 90 TABLET | Refills: 0 | Status: SHIPPED | OUTPATIENT
Start: 2025-04-04 | End: 2025-05-04

## 2025-05-04 DIAGNOSIS — G89.29 CHRONIC PAIN OF BOTH SHOULDERS: ICD-10-CM

## 2025-05-04 DIAGNOSIS — M25.511 CHRONIC PAIN OF BOTH SHOULDERS: ICD-10-CM

## 2025-05-04 DIAGNOSIS — M25.512 CHRONIC PAIN OF BOTH SHOULDERS: ICD-10-CM

## 2025-05-05 RX ORDER — METHOCARBAMOL 500 MG/1
500 TABLET, FILM COATED ORAL 4 TIMES DAILY PRN
Qty: 90 TABLET | Refills: 0 | Status: SHIPPED | OUTPATIENT
Start: 2025-05-05 | End: 2025-06-04

## 2025-05-21 DIAGNOSIS — E55.9 VITAMIN D DEFICIENCY: ICD-10-CM

## 2025-05-22 RX ORDER — ASPIRIN 325 MG
1.25 TABLET, DELAYED RELEASE (ENTERIC COATED) ORAL
Qty: 4 CAPSULE | Refills: 3 | Status: SHIPPED | OUTPATIENT
Start: 2025-05-25

## 2025-05-26 DIAGNOSIS — M25.512 CHRONIC PAIN OF BOTH SHOULDERS: ICD-10-CM

## 2025-05-26 DIAGNOSIS — M25.511 CHRONIC PAIN OF BOTH SHOULDERS: ICD-10-CM

## 2025-05-26 DIAGNOSIS — G89.29 CHRONIC PAIN OF BOTH SHOULDERS: ICD-10-CM

## 2025-05-28 RX ORDER — METHOCARBAMOL 500 MG/1
500 TABLET, FILM COATED ORAL 4 TIMES DAILY PRN
Qty: 90 TABLET | Refills: 0 | Status: SHIPPED | OUTPATIENT
Start: 2025-05-28 | End: 2025-06-27

## 2025-06-22 DIAGNOSIS — M25.511 CHRONIC PAIN OF BOTH SHOULDERS: ICD-10-CM

## 2025-06-22 DIAGNOSIS — G89.29 CHRONIC PAIN OF BOTH SHOULDERS: ICD-10-CM

## 2025-06-22 DIAGNOSIS — M25.512 CHRONIC PAIN OF BOTH SHOULDERS: ICD-10-CM

## 2025-06-23 RX ORDER — METHOCARBAMOL 500 MG/1
500 TABLET, FILM COATED ORAL 4 TIMES DAILY PRN
Qty: 90 TABLET | Refills: 0 | Status: SHIPPED | OUTPATIENT
Start: 2025-06-23 | End: 2025-07-23

## 2025-07-27 DIAGNOSIS — G89.29 CHRONIC PAIN OF BOTH SHOULDERS: ICD-10-CM

## 2025-07-27 DIAGNOSIS — M25.511 CHRONIC PAIN OF BOTH SHOULDERS: ICD-10-CM

## 2025-07-27 DIAGNOSIS — M72.2 PLANTAR FASCIITIS: ICD-10-CM

## 2025-07-27 DIAGNOSIS — M25.512 CHRONIC PAIN OF BOTH SHOULDERS: ICD-10-CM

## 2025-07-29 RX ORDER — METHOCARBAMOL 500 MG/1
500 TABLET, FILM COATED ORAL 4 TIMES DAILY PRN
Qty: 90 TABLET | Refills: 0 | Status: SHIPPED | OUTPATIENT
Start: 2025-07-29 | End: 2025-08-28

## 2025-07-29 RX ORDER — DICLOFENAC SODIUM 10 MG/G
4 GEL TOPICAL 4 TIMES DAILY
Qty: 500 G | Refills: 3 | Status: SHIPPED | OUTPATIENT
Start: 2025-07-29

## 2025-08-15 ENCOUNTER — APPOINTMENT (OUTPATIENT)
Facility: HOSPITAL | Age: 45
End: 2025-08-15
Payer: COMMERCIAL

## 2025-08-25 DIAGNOSIS — M72.2 PLANTAR FASCIITIS: ICD-10-CM

## 2025-08-25 DIAGNOSIS — G89.29 CHRONIC PAIN OF BOTH SHOULDERS: ICD-10-CM

## 2025-08-25 DIAGNOSIS — M25.511 CHRONIC PAIN OF BOTH SHOULDERS: ICD-10-CM

## 2025-08-25 DIAGNOSIS — Z98.84 BARIATRIC SURGERY STATUS: ICD-10-CM

## 2025-08-25 DIAGNOSIS — M25.512 CHRONIC PAIN OF BOTH SHOULDERS: ICD-10-CM

## 2025-08-29 RX ORDER — DICLOFENAC SODIUM 10 MG/G
4 GEL TOPICAL 4 TIMES DAILY
Qty: 500 G | Refills: 3 | Status: SHIPPED | OUTPATIENT
Start: 2025-08-29

## 2025-08-29 RX ORDER — UBIQUINOL 100 MG
CAPSULE ORAL
Qty: 100 EACH | Refills: 3 | Status: SHIPPED | OUTPATIENT
Start: 2025-08-29

## 2025-08-29 RX ORDER — METHOCARBAMOL 500 MG/1
500 TABLET, FILM COATED ORAL 4 TIMES DAILY PRN
Qty: 90 TABLET | Refills: 0 | Status: SHIPPED | OUTPATIENT
Start: 2025-08-29 | End: 2025-09-28